# Patient Record
Sex: FEMALE | Race: WHITE | NOT HISPANIC OR LATINO | Employment: FULL TIME | URBAN - METROPOLITAN AREA
[De-identification: names, ages, dates, MRNs, and addresses within clinical notes are randomized per-mention and may not be internally consistent; named-entity substitution may affect disease eponyms.]

---

## 2021-02-14 ENCOUNTER — APPOINTMENT (OUTPATIENT)
Dept: CT IMAGING | Facility: HOSPITAL | Age: 46
End: 2021-02-14

## 2021-02-14 ENCOUNTER — HOSPITAL ENCOUNTER (OUTPATIENT)
Facility: HOSPITAL | Age: 46
Setting detail: OBSERVATION
Discharge: HOME | End: 2021-02-16
Attending: EMERGENCY MEDICINE | Admitting: HOSPITALIST

## 2021-02-14 DIAGNOSIS — Z87.19 HISTORY OF IRRITABLE BOWEL SYNDROME: ICD-10-CM

## 2021-02-14 DIAGNOSIS — R10.32 ACUTE LEFT LOWER QUADRANT PAIN: ICD-10-CM

## 2021-02-14 DIAGNOSIS — K57.20 PERFORATION OF SIGMOID COLON DUE TO DIVERTICULITIS: Primary | ICD-10-CM

## 2021-02-14 PROBLEM — I10 HYPERTENSIVE DISORDER: Status: ACTIVE | Noted: 2019-01-03

## 2021-02-14 PROBLEM — I10 HYPERTENSIVE DISORDER: Status: RESOLVED | Noted: 2019-01-03 | Resolved: 2021-02-14

## 2021-02-14 PROBLEM — K58.9 IBS (IRRITABLE BOWEL SYNDROME): Chronic | Status: ACTIVE | Noted: 2021-02-14

## 2021-02-14 PROBLEM — E03.9 HYPOTHYROIDISM: Status: ACTIVE | Noted: 2018-09-06

## 2021-02-14 PROBLEM — E66.3 OVERWEIGHT: Status: ACTIVE | Noted: 2018-09-06

## 2021-02-14 PROBLEM — E66.9 OBESITY (BMI 30.0-34.9): Chronic | Status: ACTIVE | Noted: 2021-02-14

## 2021-02-14 PROBLEM — R45.86 MOOD SWINGS: Status: ACTIVE | Noted: 2021-02-14

## 2021-02-14 PROBLEM — M77.50 TENDINITIS OF FOOT: Status: ACTIVE | Noted: 2021-02-14

## 2021-02-14 PROBLEM — E66.811 OBESITY (BMI 30.0-34.9): Chronic | Status: ACTIVE | Noted: 2021-02-14

## 2021-02-14 PROBLEM — Z86.018: Status: ACTIVE | Noted: 2021-02-14

## 2021-02-14 PROBLEM — R53.83 FATIGUE: Status: ACTIVE | Noted: 2021-02-14

## 2021-02-14 PROBLEM — B36.9 DERMAL MYCOSIS: Status: ACTIVE | Noted: 2020-05-06

## 2021-02-14 LAB
ALBUMIN SERPL-MCNC: 4.7 G/DL (ref 3.5–5.2)
ALBUMIN/GLOB SERPL: 1.6 G/DL
ALP SERPL-CCNC: 74 U/L (ref 39–117)
ALT SERPL W P-5'-P-CCNC: 67 U/L (ref 1–33)
AMYLASE SERPL-CCNC: 50 U/L (ref 28–100)
ANION GAP SERPL CALCULATED.3IONS-SCNC: 11 MMOL/L (ref 5–15)
APAP SERPL-MCNC: <5 MCG/ML (ref 0–30)
AST SERPL-CCNC: 50 U/L (ref 1–32)
BASOPHILS # BLD AUTO: 0.1 10*3/MM3 (ref 0–0.2)
BASOPHILS NFR BLD AUTO: 0.5 % (ref 0–1.5)
BILIRUB SERPL-MCNC: 0.2 MG/DL (ref 0–1.2)
BILIRUB UR QL STRIP: NEGATIVE
BUN SERPL-MCNC: 12 MG/DL (ref 6–20)
BUN/CREAT SERPL: 15.2 (ref 7–25)
CALCIUM SPEC-SCNC: 9.8 MG/DL (ref 8.6–10.5)
CHLORIDE SERPL-SCNC: 106 MMOL/L (ref 98–107)
CLARITY UR: CLEAR
CO2 SERPL-SCNC: 24 MMOL/L (ref 22–29)
COLOR UR: YELLOW
CREAT SERPL-MCNC: 0.79 MG/DL (ref 0.57–1)
D-LACTATE SERPL-SCNC: 0.7 MMOL/L (ref 0.5–2)
DEPRECATED RDW RBC AUTO: 42.4 FL (ref 37–54)
EOSINOPHIL # BLD AUTO: 0.1 10*3/MM3 (ref 0–0.4)
EOSINOPHIL NFR BLD AUTO: 1 % (ref 0.3–6.2)
ERYTHROCYTE [DISTWIDTH] IN BLOOD BY AUTOMATED COUNT: 13.2 % (ref 12.3–15.4)
GFR SERPL CREATININE-BSD FRML MDRD: 78 ML/MIN/1.73
GLOBULIN UR ELPH-MCNC: 3 GM/DL
GLUCOSE SERPL-MCNC: 88 MG/DL (ref 65–99)
GLUCOSE UR STRIP-MCNC: NEGATIVE MG/DL
HAV IGM SERPL QL IA: NORMAL
HBV CORE IGM SERPL QL IA: NORMAL
HBV SURFACE AG SERPL QL IA: NORMAL
HCG INTACT+B SERPL-ACNC: 3.56 MIU/ML
HCT VFR BLD AUTO: 37.4 % (ref 34–46.6)
HCV AB SER DONR QL: NORMAL
HGB BLD-MCNC: 13 G/DL (ref 12–15.9)
HGB UR QL STRIP.AUTO: NEGATIVE
HOLD SPECIMEN: NORMAL
KETONES UR QL STRIP: NEGATIVE
LEUKOCYTE ESTERASE UR QL STRIP.AUTO: NEGATIVE
LIPASE SERPL-CCNC: 51 U/L (ref 13–60)
LYMPHOCYTES # BLD AUTO: 1.6 10*3/MM3 (ref 0.7–3.1)
LYMPHOCYTES NFR BLD AUTO: 13.5 % (ref 19.6–45.3)
MCH RBC QN AUTO: 31.7 PG (ref 26.6–33)
MCHC RBC AUTO-ENTMCNC: 34.7 G/DL (ref 31.5–35.7)
MCV RBC AUTO: 91.2 FL (ref 79–97)
MONOCYTES # BLD AUTO: 0.9 10*3/MM3 (ref 0.1–0.9)
MONOCYTES NFR BLD AUTO: 7.1 % (ref 5–12)
NEUTROPHILS NFR BLD AUTO: 77.9 % (ref 42.7–76)
NEUTROPHILS NFR BLD AUTO: 9.4 10*3/MM3 (ref 1.7–7)
NITRITE UR QL STRIP: NEGATIVE
NRBC BLD AUTO-RTO: 0.1 /100 WBC (ref 0–0.2)
PH UR STRIP.AUTO: <=5 [PH] (ref 5–8)
PLATELET # BLD AUTO: 434 10*3/MM3 (ref 140–450)
PMV BLD AUTO: 6.9 FL (ref 6–12)
POTASSIUM SERPL-SCNC: 3.9 MMOL/L (ref 3.5–5.2)
PROCALCITONIN SERPL-MCNC: 0.04 NG/ML (ref 0–0.25)
PROT SERPL-MCNC: 7.7 G/DL (ref 6–8.5)
PROT UR QL STRIP: NEGATIVE
RBC # BLD AUTO: 4.1 10*6/MM3 (ref 3.77–5.28)
SODIUM SERPL-SCNC: 141 MMOL/L (ref 136–145)
SP GR UR STRIP: 1.01 (ref 1–1.03)
UROBILINOGEN UR QL STRIP: NORMAL
WBC # BLD AUTO: 12.1 10*3/MM3 (ref 3.4–10.8)
WHOLE BLOOD HOLD SPECIMEN: NORMAL

## 2021-02-14 PROCEDURE — 96368 THER/DIAG CONCURRENT INF: CPT

## 2021-02-14 PROCEDURE — 80307 DRUG TEST PRSMV CHEM ANLYZR: CPT | Performed by: HOSPITALIST

## 2021-02-14 PROCEDURE — 96375 TX/PRO/DX INJ NEW DRUG ADDON: CPT

## 2021-02-14 PROCEDURE — 85025 COMPLETE CBC W/AUTO DIFF WBC: CPT | Performed by: EMERGENCY MEDICINE

## 2021-02-14 PROCEDURE — 74177 CT ABD & PELVIS W/CONTRAST: CPT

## 2021-02-14 PROCEDURE — 25010000002 ONDANSETRON PER 1 MG: Performed by: EMERGENCY MEDICINE

## 2021-02-14 PROCEDURE — 83605 ASSAY OF LACTIC ACID: CPT | Performed by: NURSE PRACTITIONER

## 2021-02-14 PROCEDURE — 81025 URINE PREGNANCY TEST: CPT | Performed by: HOSPITALIST

## 2021-02-14 PROCEDURE — 25010000002 CEFEPIME PER 500 MG: Performed by: NURSE PRACTITIONER

## 2021-02-14 PROCEDURE — 96365 THER/PROPH/DIAG IV INF INIT: CPT

## 2021-02-14 PROCEDURE — 96366 THER/PROPH/DIAG IV INF ADDON: CPT

## 2021-02-14 PROCEDURE — 96367 TX/PROPH/DG ADDL SEQ IV INF: CPT

## 2021-02-14 PROCEDURE — G0378 HOSPITAL OBSERVATION PER HR: HCPCS

## 2021-02-14 PROCEDURE — 81003 URINALYSIS AUTO W/O SCOPE: CPT | Performed by: EMERGENCY MEDICINE

## 2021-02-14 PROCEDURE — 83690 ASSAY OF LIPASE: CPT | Performed by: EMERGENCY MEDICINE

## 2021-02-14 PROCEDURE — 25010000002 HYDROMORPHONE PER 4 MG: Performed by: EMERGENCY MEDICINE

## 2021-02-14 PROCEDURE — 0 IOPAMIDOL PER 1 ML: Performed by: EMERGENCY MEDICINE

## 2021-02-14 PROCEDURE — 84145 PROCALCITONIN (PCT): CPT | Performed by: NURSE PRACTITIONER

## 2021-02-14 PROCEDURE — 25010000002 LEVOFLOXACIN PER 250 MG: Performed by: EMERGENCY MEDICINE

## 2021-02-14 PROCEDURE — 80143 DRUG ASSAY ACETAMINOPHEN: CPT | Performed by: NURSE PRACTITIONER

## 2021-02-14 PROCEDURE — 99220 PR INITIAL OBSERVATION CARE/DAY 70 MINUTES: CPT | Performed by: NURSE PRACTITIONER

## 2021-02-14 PROCEDURE — 99285 EMERGENCY DEPT VISIT HI MDM: CPT

## 2021-02-14 PROCEDURE — U0004 COV-19 TEST NON-CDC HGH THRU: HCPCS | Performed by: EMERGENCY MEDICINE

## 2021-02-14 PROCEDURE — 80053 COMPREHEN METABOLIC PANEL: CPT | Performed by: EMERGENCY MEDICINE

## 2021-02-14 PROCEDURE — 25010000002 MORPHINE PER 10 MG: Performed by: NURSE PRACTITIONER

## 2021-02-14 PROCEDURE — 80074 ACUTE HEPATITIS PANEL: CPT | Performed by: NURSE PRACTITIONER

## 2021-02-14 PROCEDURE — 96376 TX/PRO/DX INJ SAME DRUG ADON: CPT

## 2021-02-14 PROCEDURE — 25010000002 ENOXAPARIN PER 10 MG: Performed by: NURSE PRACTITIONER

## 2021-02-14 PROCEDURE — 82150 ASSAY OF AMYLASE: CPT | Performed by: NURSE PRACTITIONER

## 2021-02-14 PROCEDURE — C9803 HOPD COVID-19 SPEC COLLECT: HCPCS

## 2021-02-14 PROCEDURE — 25010000002 ONDANSETRON PER 1 MG: Performed by: NURSE PRACTITIONER

## 2021-02-14 PROCEDURE — 96372 THER/PROPH/DIAG INJ SC/IM: CPT

## 2021-02-14 PROCEDURE — 84702 CHORIONIC GONADOTROPIN TEST: CPT | Performed by: NURSE PRACTITIONER

## 2021-02-14 PROCEDURE — 87040 BLOOD CULTURE FOR BACTERIA: CPT | Performed by: NURSE PRACTITIONER

## 2021-02-14 PROCEDURE — 99284 EMERGENCY DEPT VISIT MOD MDM: CPT

## 2021-02-14 RX ORDER — ACETAMINOPHEN 160 MG/5ML
650 SOLUTION ORAL EVERY 4 HOURS PRN
Status: DISCONTINUED | OUTPATIENT
Start: 2021-02-14 | End: 2021-02-16 | Stop reason: HOSPADM

## 2021-02-14 RX ORDER — MORPHINE SULFATE 4 MG/ML
1 INJECTION, SOLUTION INTRAMUSCULAR; INTRAVENOUS EVERY 4 HOURS PRN
Status: DISCONTINUED | OUTPATIENT
Start: 2021-02-14 | End: 2021-02-15

## 2021-02-14 RX ORDER — HYDROMORPHONE HCL 110MG/55ML
0.5 PATIENT CONTROLLED ANALGESIA SYRINGE INTRAVENOUS ONCE
Status: COMPLETED | OUTPATIENT
Start: 2021-02-14 | End: 2021-02-14

## 2021-02-14 RX ORDER — BISACODYL 10 MG
10 SUPPOSITORY, RECTAL RECTAL DAILY PRN
Status: DISCONTINUED | OUTPATIENT
Start: 2021-02-14 | End: 2021-02-16 | Stop reason: HOSPADM

## 2021-02-14 RX ORDER — SODIUM CHLORIDE 9 MG/ML
100 INJECTION, SOLUTION INTRAVENOUS CONTINUOUS
Status: DISCONTINUED | OUTPATIENT
Start: 2021-02-14 | End: 2021-02-16 | Stop reason: HOSPADM

## 2021-02-14 RX ORDER — FERROUS SULFATE 325(65) MG
325 TABLET ORAL
COMMUNITY
End: 2022-12-21 | Stop reason: ALTCHOICE

## 2021-02-14 RX ORDER — NITROGLYCERIN 0.4 MG/1
0.4 TABLET SUBLINGUAL
Status: DISCONTINUED | OUTPATIENT
Start: 2021-02-14 | End: 2021-02-16 | Stop reason: HOSPADM

## 2021-02-14 RX ORDER — LEVOFLOXACIN 5 MG/ML
750 INJECTION, SOLUTION INTRAVENOUS ONCE
Status: COMPLETED | OUTPATIENT
Start: 2021-02-14 | End: 2021-02-14

## 2021-02-14 RX ORDER — SODIUM CHLORIDE 0.9 % (FLUSH) 0.9 %
10 SYRINGE (ML) INJECTION EVERY 12 HOURS SCHEDULED
Status: DISCONTINUED | OUTPATIENT
Start: 2021-02-14 | End: 2021-02-16 | Stop reason: HOSPADM

## 2021-02-14 RX ORDER — ACETAMINOPHEN 325 MG/1
650 TABLET ORAL EVERY 4 HOURS PRN
Status: DISCONTINUED | OUTPATIENT
Start: 2021-02-14 | End: 2021-02-16 | Stop reason: HOSPADM

## 2021-02-14 RX ORDER — ACETAMINOPHEN 650 MG/1
650 SUPPOSITORY RECTAL EVERY 4 HOURS PRN
Status: DISCONTINUED | OUTPATIENT
Start: 2021-02-14 | End: 2021-02-16 | Stop reason: HOSPADM

## 2021-02-14 RX ORDER — SODIUM CHLORIDE 0.9 % (FLUSH) 0.9 %
10 SYRINGE (ML) INJECTION AS NEEDED
Status: DISCONTINUED | OUTPATIENT
Start: 2021-02-14 | End: 2021-02-16 | Stop reason: HOSPADM

## 2021-02-14 RX ORDER — MELATONIN
2000 DAILY
COMMUNITY
End: 2022-12-21 | Stop reason: ALTCHOICE

## 2021-02-14 RX ORDER — ONDANSETRON 2 MG/ML
4 INJECTION INTRAMUSCULAR; INTRAVENOUS EVERY 6 HOURS PRN
Status: DISCONTINUED | OUTPATIENT
Start: 2021-02-14 | End: 2021-02-16 | Stop reason: HOSPADM

## 2021-02-14 RX ORDER — MULTIPLE VITAMINS W/ MINERALS TAB 9MG-400MCG
1 TAB ORAL DAILY
COMMUNITY
End: 2022-12-21 | Stop reason: ALTCHOICE

## 2021-02-14 RX ORDER — ONDANSETRON 2 MG/ML
4 INJECTION INTRAMUSCULAR; INTRAVENOUS ONCE
Status: COMPLETED | OUTPATIENT
Start: 2021-02-14 | End: 2021-02-14

## 2021-02-14 RX ADMIN — SODIUM CHLORIDE 100 ML/HR: 9 INJECTION, SOLUTION INTRAVENOUS at 22:33

## 2021-02-14 RX ADMIN — LEVOFLOXACIN 750 MG: 5 INJECTION, SOLUTION INTRAVENOUS at 20:38

## 2021-02-14 RX ADMIN — METRONIDAZOLE 500 MG: 500 INJECTION, SOLUTION INTRAVENOUS at 20:38

## 2021-02-14 RX ADMIN — ONDANSETRON 4 MG: 2 INJECTION, SOLUTION INTRAMUSCULAR; INTRAVENOUS at 23:04

## 2021-02-14 RX ADMIN — Medication 10 ML: at 22:33

## 2021-02-14 RX ADMIN — SODIUM CHLORIDE 1000 ML: 9 INJECTION, SOLUTION INTRAVENOUS at 20:44

## 2021-02-14 RX ADMIN — MORPHINE SULFATE 1 MG: 4 INJECTION INTRAVENOUS at 23:04

## 2021-02-14 RX ADMIN — IOPAMIDOL 100 ML: 755 INJECTION, SOLUTION INTRAVENOUS at 18:13

## 2021-02-14 RX ADMIN — ONDANSETRON 4 MG: 2 INJECTION, SOLUTION INTRAMUSCULAR; INTRAVENOUS at 17:05

## 2021-02-14 RX ADMIN — SODIUM CHLORIDE 1000 ML: 9 INJECTION, SOLUTION INTRAVENOUS at 17:05

## 2021-02-14 RX ADMIN — CEFEPIME HYDROCHLORIDE 2 G: 2 INJECTION, POWDER, FOR SOLUTION INTRAVENOUS at 23:05

## 2021-02-14 RX ADMIN — HYDROMORPHONE HYDROCHLORIDE 0.5 MG: 2 INJECTION, SOLUTION INTRAMUSCULAR; INTRAVENOUS; SUBCUTANEOUS at 17:05

## 2021-02-14 RX ADMIN — ENOXAPARIN SODIUM 40 MG: 40 INJECTION SUBCUTANEOUS at 23:04

## 2021-02-14 RX ADMIN — HYDROMORPHONE HYDROCHLORIDE 0.5 MG: 2 INJECTION, SOLUTION INTRAMUSCULAR; INTRAVENOUS; SUBCUTANEOUS at 20:38

## 2021-02-15 LAB
ALBUMIN SERPL-MCNC: 3.7 G/DL (ref 3.5–5.2)
ALBUMIN/GLOB SERPL: 1.5 G/DL
ALP SERPL-CCNC: 59 U/L (ref 39–117)
ALT SERPL W P-5'-P-CCNC: 48 U/L (ref 1–33)
AMPHET+METHAMPHET UR QL: POSITIVE
ANION GAP SERPL CALCULATED.3IONS-SCNC: 10 MMOL/L (ref 5–15)
AST SERPL-CCNC: 32 U/L (ref 1–32)
B-HCG UR QL: NEGATIVE
BARBITURATES UR QL SCN: NEGATIVE
BASOPHILS # BLD AUTO: 0.1 10*3/MM3 (ref 0–0.2)
BASOPHILS NFR BLD AUTO: 1 % (ref 0–1.5)
BENZODIAZ UR QL SCN: NEGATIVE
BILIRUB SERPL-MCNC: 0.2 MG/DL (ref 0–1.2)
BUN SERPL-MCNC: 8 MG/DL (ref 6–20)
BUN/CREAT SERPL: 10.4 (ref 7–25)
CALCIUM SPEC-SCNC: 8.8 MG/DL (ref 8.6–10.5)
CANNABINOIDS SERPL QL: NEGATIVE
CHLORIDE SERPL-SCNC: 109 MMOL/L (ref 98–107)
CO2 SERPL-SCNC: 22 MMOL/L (ref 22–29)
COCAINE UR QL: NEGATIVE
CREAT SERPL-MCNC: 0.77 MG/DL (ref 0.57–1)
D-LACTATE SERPL-SCNC: 0.6 MMOL/L (ref 0.5–2)
DEPRECATED RDW RBC AUTO: 42.4 FL (ref 37–54)
EOSINOPHIL # BLD AUTO: 0.3 10*3/MM3 (ref 0–0.4)
EOSINOPHIL NFR BLD AUTO: 3.9 % (ref 0.3–6.2)
ERYTHROCYTE [DISTWIDTH] IN BLOOD BY AUTOMATED COUNT: 13.2 % (ref 12.3–15.4)
GFR SERPL CREATININE-BSD FRML MDRD: 81 ML/MIN/1.73
GLOBULIN UR ELPH-MCNC: 2.4 GM/DL
GLUCOSE SERPL-MCNC: 81 MG/DL (ref 65–99)
HCT VFR BLD AUTO: 33.8 % (ref 34–46.6)
HGB BLD-MCNC: 11.4 G/DL (ref 12–15.9)
LYMPHOCYTES # BLD AUTO: 2.4 10*3/MM3 (ref 0.7–3.1)
LYMPHOCYTES NFR BLD AUTO: 34.9 % (ref 19.6–45.3)
MCH RBC QN AUTO: 31 PG (ref 26.6–33)
MCHC RBC AUTO-ENTMCNC: 33.6 G/DL (ref 31.5–35.7)
MCV RBC AUTO: 92.2 FL (ref 79–97)
METHADONE UR QL SCN: NEGATIVE
MONOCYTES # BLD AUTO: 0.7 10*3/MM3 (ref 0.1–0.9)
MONOCYTES NFR BLD AUTO: 10.6 % (ref 5–12)
NEUTROPHILS NFR BLD AUTO: 3.4 10*3/MM3 (ref 1.7–7)
NEUTROPHILS NFR BLD AUTO: 49.6 % (ref 42.7–76)
NRBC BLD AUTO-RTO: 0.1 /100 WBC (ref 0–0.2)
OPIATES UR QL: NEGATIVE
OXYCODONE UR QL SCN: NEGATIVE
PLATELET # BLD AUTO: 371 10*3/MM3 (ref 140–450)
PMV BLD AUTO: 7.1 FL (ref 6–12)
POTASSIUM SERPL-SCNC: 3.7 MMOL/L (ref 3.5–5.2)
PROT SERPL-MCNC: 6.1 G/DL (ref 6–8.5)
RBC # BLD AUTO: 3.67 10*6/MM3 (ref 3.77–5.28)
SARS-COV-2 ORF1AB RESP QL NAA+PROBE: NOT DETECTED
SODIUM SERPL-SCNC: 141 MMOL/L (ref 136–145)
WBC # BLD AUTO: 6.9 10*3/MM3 (ref 3.4–10.8)

## 2021-02-15 PROCEDURE — G0378 HOSPITAL OBSERVATION PER HR: HCPCS

## 2021-02-15 PROCEDURE — 96375 TX/PRO/DX INJ NEW DRUG ADDON: CPT

## 2021-02-15 PROCEDURE — 85025 COMPLETE CBC W/AUTO DIFF WBC: CPT | Performed by: NURSE PRACTITIONER

## 2021-02-15 PROCEDURE — 25010000002 METOCLOPRAMIDE PER 10 MG: Performed by: HOSPITALIST

## 2021-02-15 PROCEDURE — 25010000002 KETOROLAC TROMETHAMINE PER 15 MG: Performed by: HOSPITALIST

## 2021-02-15 PROCEDURE — 96366 THER/PROPH/DIAG IV INF ADDON: CPT

## 2021-02-15 PROCEDURE — 25010000002 CEFEPIME PER 500 MG: Performed by: NURSE PRACTITIONER

## 2021-02-15 PROCEDURE — 99203 OFFICE O/P NEW LOW 30 MIN: CPT | Performed by: SURGERY

## 2021-02-15 PROCEDURE — 80184 ASSAY OF PHENOBARBITAL: CPT | Performed by: NURSE PRACTITIONER

## 2021-02-15 PROCEDURE — 25010000002 HYDROMORPHONE PER 4 MG: Performed by: NURSE PRACTITIONER

## 2021-02-15 PROCEDURE — 25010000002 DIPHENHYDRAMINE PER 50 MG: Performed by: HOSPITALIST

## 2021-02-15 PROCEDURE — 25010000002 MORPHINE PER 10 MG: Performed by: HOSPITALIST

## 2021-02-15 PROCEDURE — 25010000002 ENOXAPARIN PER 10 MG: Performed by: NURSE PRACTITIONER

## 2021-02-15 PROCEDURE — G0480 DRUG TEST DEF 1-7 CLASSES: HCPCS | Performed by: NURSE PRACTITIONER

## 2021-02-15 PROCEDURE — 25010000002 ONDANSETRON PER 1 MG: Performed by: NURSE PRACTITIONER

## 2021-02-15 PROCEDURE — 96372 THER/PROPH/DIAG INJ SC/IM: CPT

## 2021-02-15 PROCEDURE — 96376 TX/PRO/DX INJ SAME DRUG ADON: CPT

## 2021-02-15 PROCEDURE — 83605 ASSAY OF LACTIC ACID: CPT | Performed by: NURSE PRACTITIONER

## 2021-02-15 PROCEDURE — 99225 PR SBSQ OBSERVATION CARE/DAY 25 MINUTES: CPT | Performed by: HOSPITALIST

## 2021-02-15 PROCEDURE — 80053 COMPREHEN METABOLIC PANEL: CPT | Performed by: NURSE PRACTITIONER

## 2021-02-15 PROCEDURE — 80179 DRUG ASSAY SALICYLATE: CPT | Performed by: NURSE PRACTITIONER

## 2021-02-15 RX ORDER — PROCHLORPERAZINE EDISYLATE 5 MG/ML
10 INJECTION INTRAMUSCULAR; INTRAVENOUS EVERY 6 HOURS PRN
Status: DISCONTINUED | OUTPATIENT
Start: 2021-02-15 | End: 2021-02-16 | Stop reason: HOSPADM

## 2021-02-15 RX ORDER — KETOROLAC TROMETHAMINE 30 MG/ML
30 INJECTION, SOLUTION INTRAMUSCULAR; INTRAVENOUS ONCE
Status: COMPLETED | OUTPATIENT
Start: 2021-02-15 | End: 2021-02-15

## 2021-02-15 RX ORDER — METOCLOPRAMIDE HYDROCHLORIDE 5 MG/ML
10 INJECTION INTRAMUSCULAR; INTRAVENOUS ONCE
Status: COMPLETED | OUTPATIENT
Start: 2021-02-15 | End: 2021-02-15

## 2021-02-15 RX ORDER — DIPHENHYDRAMINE HYDROCHLORIDE 50 MG/ML
25 INJECTION INTRAMUSCULAR; INTRAVENOUS ONCE
Status: COMPLETED | OUTPATIENT
Start: 2021-02-15 | End: 2021-02-15

## 2021-02-15 RX ORDER — MORPHINE SULFATE 4 MG/ML
2 INJECTION, SOLUTION INTRAMUSCULAR; INTRAVENOUS EVERY 4 HOURS PRN
Status: DISCONTINUED | OUTPATIENT
Start: 2021-02-15 | End: 2021-02-16 | Stop reason: HOSPADM

## 2021-02-15 RX ORDER — HYDROMORPHONE HCL 110MG/55ML
1 PATIENT CONTROLLED ANALGESIA SYRINGE INTRAVENOUS
Status: DISCONTINUED | OUTPATIENT
Start: 2021-02-15 | End: 2021-02-15

## 2021-02-15 RX ADMIN — ENOXAPARIN SODIUM 40 MG: 40 INJECTION SUBCUTANEOUS at 15:12

## 2021-02-15 RX ADMIN — METRONIDAZOLE 500 MG: 500 INJECTION, SOLUTION INTRAVENOUS at 05:04

## 2021-02-15 RX ADMIN — METOCLOPRAMIDE HYDROCHLORIDE 10 MG: 5 INJECTION INTRAMUSCULAR; INTRAVENOUS at 11:17

## 2021-02-15 RX ADMIN — HYDROMORPHONE HYDROCHLORIDE 1 MG: 2 INJECTION, SOLUTION INTRAMUSCULAR; INTRAVENOUS; SUBCUTANEOUS at 04:57

## 2021-02-15 RX ADMIN — CEFEPIME HYDROCHLORIDE 2 G: 2 INJECTION, POWDER, FOR SOLUTION INTRAVENOUS at 14:37

## 2021-02-15 RX ADMIN — METRONIDAZOLE 500 MG: 500 INJECTION, SOLUTION INTRAVENOUS at 14:35

## 2021-02-15 RX ADMIN — Medication 10 ML: at 08:03

## 2021-02-15 RX ADMIN — SODIUM CHLORIDE 100 ML/HR: 9 INJECTION, SOLUTION INTRAVENOUS at 14:37

## 2021-02-15 RX ADMIN — ONDANSETRON 4 MG: 2 INJECTION, SOLUTION INTRAMUSCULAR; INTRAVENOUS at 08:01

## 2021-02-15 RX ADMIN — ACETAMINOPHEN 650 MG: 325 TABLET, FILM COATED ORAL at 10:28

## 2021-02-15 RX ADMIN — HYDROMORPHONE HYDROCHLORIDE 1 MG: 2 INJECTION, SOLUTION INTRAMUSCULAR; INTRAVENOUS; SUBCUTANEOUS at 01:07

## 2021-02-15 RX ADMIN — MORPHINE SULFATE 2 MG: 4 INJECTION INTRAVENOUS at 20:57

## 2021-02-15 RX ADMIN — METRONIDAZOLE 500 MG: 500 INJECTION, SOLUTION INTRAVENOUS at 20:36

## 2021-02-15 RX ADMIN — CEFEPIME HYDROCHLORIDE 2 G: 2 INJECTION, POWDER, FOR SOLUTION INTRAVENOUS at 05:45

## 2021-02-15 RX ADMIN — HYDROMORPHONE HYDROCHLORIDE 1 MG: 2 INJECTION, SOLUTION INTRAMUSCULAR; INTRAVENOUS; SUBCUTANEOUS at 08:02

## 2021-02-15 RX ADMIN — KETOROLAC TROMETHAMINE 30 MG: 30 INJECTION, SOLUTION INTRAMUSCULAR at 11:17

## 2021-02-15 RX ADMIN — Medication 10 ML: at 20:37

## 2021-02-15 RX ADMIN — CEFEPIME HYDROCHLORIDE 2 G: 2 INJECTION, POWDER, FOR SOLUTION INTRAVENOUS at 21:34

## 2021-02-15 RX ADMIN — MORPHINE SULFATE 2 MG: 4 INJECTION INTRAVENOUS at 15:12

## 2021-02-15 RX ADMIN — DIPHENHYDRAMINE HYDROCHLORIDE 25 MG: 50 INJECTION INTRAMUSCULAR; INTRAVENOUS at 11:17

## 2021-02-15 NOTE — ED NOTES
Assumed care of this patient from NOHEMI Carbajal at 1900     Leslee Saunders LPN  02/14/21 1911

## 2021-02-15 NOTE — CONSULTS
"GENERAL SURGERY CONSULT      Patient Care Team:  Sergio Clifford MD as PCP - General (Family Medicine)    Chief complaint lower abdominal pain  Subjective     Is a 45-year-old lady who began having some sharp and severe lower abdominal pain about 3 days ago.  She describes it as high amount of pressure building up in the area.  She had no fever no chills.  She did try some Excedrin Migraine tablets but this did not help.  She came to the emergency room and CT scan demonstrated evidence of sigmoid diverticulitis.  There was a possibility of a possible abscess formation but this is unclear.    She has a past surgical history of a hysterectomy for endometriosis.  She had some kind of fatty tumor removed in the left lower abdomen.  She is also had a cholecystectomy and  section.    Review of Systems   All systems were reviewed and negative except for:  Gastrointestinal: positive for  pain and See HPI    History  Past Medical History:   Diagnosis Date   • Acetaminophen abuse     Takes 3 Excedrin Migraine daily   • Hypertension    • IBS (irritable bowel syndrome)    • Migraines      Past Surgical History:   Procedure Laterality Date   • ABDOMINAL SURGERY      Patient states that after last  section, she was told that her organs had fused together and the surgeon had to \"rebuild \"her abdomen.   •  SECTION     • CHOLECYSTECTOMY     • HYSTERECTOMY     • TUMOR REMOVAL Left     Fatty tumor left lower abdomen     Family History   Problem Relation Age of Onset   • Cancer Mother      Social History     Tobacco Use   • Smoking status: Former Smoker     Packs/day: 1.00     Years: 20.00     Pack years: 20.00     Quit date: 2017     Years since quittin.0   • Smokeless tobacco: Never Used   Substance Use Topics   • Alcohol use: Not Currently   • Drug use: Never     Medications Prior to Admission   Medication Sig Dispense Refill Last Dose   • cholecalciferol (VITAMIN D3) 25 MCG (1000 UT) tablet Take " 2,000 Units by mouth Daily.      • ferrous sulfate 325 (65 FE) MG tablet Take 325 mg by mouth Daily With Breakfast.      • multivitamin with minerals tablet tablet Take 1 tablet by mouth Daily.        Allergies:  Dilaudid [hydromorphone hcl], Metformin, and Penicillins    Objective     Vital Signs  Temp:  [97.7 °F (36.5 °C)-98.5 °F (36.9 °C)] 98 °F (36.7 °C)  Heart Rate:  [75-94] 94  Resp:  [14-18] 16  BP: (111-135)/(60-82) 122/70    Physical Exam:      General Appearance:    Alert, cooperative, in no acute distress   Head:    Normocephalic, without obvious abnormality, atraumatic,    Eyes:            Lids and lashes normal, conjunctivae and sclerae normal, no   icterus, no pallor, corneas clear, PERRLA   Ears:    Ears appear intact with no abnormalities noted   Throat:   No oral lesions, no thrush, oral mucosa moist   Neck:   No adenopathy, supple, trachea midline, no thyromegaly, no   carotid bruit, no JVD   Back:     No kyphosis present, no scoliosis present, no skin lesions,      erythema or scars, no tenderness to percussion or                   palpation,   range of motion normal   Lungs:     Clear to auscultation,respirations regular, even and                  unlabored    Heart:    Regular rhythm and normal rate, normal S1 and S2, no            murmur, no gallop, no rub, no click   Chest Wall:    No abnormalities observed   Abdomen:     Normal bowel sounds, no masses, no organomegaly, soft        very tender just to the left of midline in the suprapubic area but also tender across the entire lower abdomen., non-distended, no guarding, no rebound                tenderness   Rectal:     Deferred   Extremities: No edema, good ROM   Pulses:   Pulses palpable and equal bilaterally   Skin:   No bleeding, bruising or rash   Lymph nodes:   No palpable adenopathy   Neurologic:   Cranial nerves 2 - 12 grossly intact, sensation intact, DTR       present and equal bilaterally     Lab Results (last 24 hours)      Procedure Component Value Units Date/Time    COVID PRE-OP / PRE-PROCEDURE SCREENING ORDER (NO ISOLATION) - Swab, Nasopharynx [[id number]]  (Normal) Collected: 02/14/21 2039    Specimen: Swab from Nasopharynx Updated: 02/15/21 1422    Narrative:      The following orders were created for panel order COVID PRE-OP / PRE-PROCEDURE SCREENING ORDER (NO ISOLATION) - Swab, Nasopharynx.  Procedure                               Abnormality         Status                     ---------                               -----------         ------                     COVID-19,APTIMA PANTHER,...[[id number]]  Normal              Final result                 Please view results for these tests on the individual orders.    COVID-19,APTIMA PANTHER,[facility]SE, NP/OP SWAB IN UTM/VTM/[facility] TRANSPORT MEDIA,24 HR TAT - Swab, Nasopharynx [[id number]]  (Normal) Collected: 02/14/21 2039    Specimen: Swab from Nasopharynx Updated: 02/15/21 1422     COVID19 Not Detected    Narrative:      Fact sheet for providers: https://www.fda.gov/media/758108/download     Fact sheet for patients: https://www.fda.gov/media/094474/download    Test performed by RT PCR.    Urine Drug Screen - Urine, Clean Catch [[id number]]  (Abnormal) Collected: 02/14/21 1659    Specimen: Urine, Clean Catch Updated: 02/15/21 1126     Amphet/Methamphet, Screen Positive     Barbiturates Screen, Urine Negative     Benzodiazepine Screen, Urine Negative     Cocaine Screen, Urine Negative     Opiate Screen Negative     THC, Screen, Urine Negative     Methadone Screen, Urine Negative     Oxycodone Screen, Urine Negative    Narrative:      Negative Thresholds For Drugs Screened:     Amphetamines               500 ng/ml   Barbiturates               200 ng/ml   Benzodiazepines            100 ng/ml   Cocaine                    300 ng/ml   Methadone                  300 ng/ml   Opiates                    300 ng/ml   Oxycodone                  100 ng/ml   THC                        50  ng/ml    The Normal Value for all drugs tested is negative. This report includes final unconfirmed screening results to be used for medical treatment purposes only. Unconfirmed results must not be used for non-medical purposes such as employment or legal testing. Clinical consideration should be applied to any drug of abuse test, particulary when unconfirmed results are used.  All urine drugs of abuse requests without chain of custody are for medical screening purposes only.  False positives are possible.      Pregnancy, Urine - Urine, Clean Catch [[id number]]  (Normal) Collected: 02/14/21 1659    Specimen: Urine, Clean Catch Updated: 02/15/21 1109     HCG, Urine QL Negative    Lactic Acid, Plasma [[id number]]  (Normal) Collected: 02/15/21 0333    Specimen: Blood Updated: 02/15/21 0401     Lactate 0.6 mmol/L     Comprehensive Metabolic Panel [[id number]]  (Abnormal) Collected: 02/15/21 0333    Specimen: Blood Updated: 02/15/21 0400     Glucose 81 mg/dL      BUN 8 mg/dL      Creatinine 0.77 mg/dL      Sodium 141 mmol/L      Potassium 3.7 mmol/L      Chloride 109 mmol/L      CO2 22.0 mmol/L      Calcium 8.8 mg/dL      Total Protein 6.1 g/dL      Albumin 3.70 g/dL      ALT (SGPT) 48 U/L      AST (SGOT) 32 U/L      Alkaline Phosphatase 59 U/L      Total Bilirubin 0.2 mg/dL      eGFR Non African Amer 81 mL/min/1.73      Globulin 2.4 gm/dL      A/G Ratio 1.5 g/dL      BUN/Creatinine Ratio 10.4     Anion Gap 10.0 mmol/L     Narrative:      GFR Normal >60  Chronic Kidney Disease <60  Kidney Failure <15      CBC & Differential [[id number]]  (Abnormal) Collected: 02/15/21 0333    Specimen: Blood Updated: 02/15/21 0343    Narrative:      The following orders were created for panel order CBC & Differential.  Procedure                               Abnormality         Status                     ---------                               -----------         ------                     CBC Auto Differential[[id number]]        Abnormal             Final result                 Please view results for these tests on the individual orders.    CBC Auto Differential [[id number]]  (Abnormal) Collected: 02/15/21 0333    Specimen: Blood Updated: 02/15/21 0343     WBC 6.90 10*3/mm3      RBC 3.67 10*6/mm3      Hemoglobin 11.4 g/dL      Hematocrit 33.8 %      MCV 92.2 fL      MCH 31.0 pg      MCHC 33.6 g/dL      RDW 13.2 %      RDW-SD 42.4 fl      MPV 7.1 fL      Platelets 371 10*3/mm3      Neutrophil % 49.6 %      Lymphocyte % 34.9 %      Monocyte % 10.6 %      Eosinophil % 3.9 %      Basophil % 1.0 %      Neutrophils, Absolute 3.40 10*3/mm3      Lymphocytes, Absolute 2.40 10*3/mm3      Monocytes, Absolute 0.70 10*3/mm3      Eosinophils, Absolute 0.30 10*3/mm3      Basophils, Absolute 0.10 10*3/mm3      nRBC 0.1 /100 WBC     Toxicology Screen, Serum [[id number]] Collected: 02/15/21 0333    Specimen: Blood Updated: 02/15/21 0339    Amylase [[id number]]  (Normal) Collected: 02/14/21 2243    Specimen: Blood Updated: 02/14/21 2344     Amylase 50 U/L     Hepatitis Panel, Acute [[id number]]  (Normal) Collected: 02/14/21 2243    Specimen: Blood Updated: 02/14/21 2344     Hepatitis B Surface Ag Non-Reactive     Hep A IgM Non-Reactive     Hep B C IgM Non-Reactive     Hepatitis C Ab Non-Reactive    Narrative:      Results may be falsely decreased if patient taking Biotin.     Acetaminophen Level [[id number]]  (Normal) Collected: 02/14/21 2243    Specimen: Blood Updated: 02/14/21 2344     Acetaminophen <5.0 mcg/mL     Narrative:      Acetaminophen Therapeutic Range  5-20 ug/mL      Hours after ingestion            Toxic Value    4 Hours                           150 ug/mL    8 Hours                            70 ug/mL   12 Hours                            40 ug/mL   16 Hours                            20 ug/mL    These values apply to a single ingestion only.     Procalcitonin [[id number]]  (Normal) Collected: 02/14/21 2243    Specimen: Blood Updated: 02/14/21 2332      "Procalcitonin 0.04 ng/mL     Narrative:      As a Marker for Sepsis (Non-Neonates):   1. <0.5 ng/mL represents a low risk of severe sepsis and/or septic shock.  1. >2 ng/mL represents a high risk of severe sepsis and/or septic shock.    As a Marker for Lower Respiratory Tract Infections that require antibiotic therapy:  PCT on Admission     Antibiotic Therapy             6-12 Hrs later  > 0.5                Strongly Recommended            >0.25 - <0.5         Recommended  0.1 - 0.25           Discouraged                   Remeasure/reassess PCT  <0.1                 Strongly Discouraged          Remeasure/reassess PCT      As 28 day mortality risk marker: \"Change in Procalcitonin Result\" (> 80 % or <=80 %) if Day 0 (or Day 1) and Day 4 values are available. Refer to http://www.[employer]-calculator.[employer]/   Change in PCT <=80 %   A decrease of PCT levels below or equal to 80 % defines a positive change in PCT test result representing a higher risk for 28-day all-cause mortality of patients diagnosed with severe sepsis or septic shock.  Change in PCT > 80 %   A decrease of PCT levels of more than 80 % defines a negative change in PCT result representing a lower risk for 28-day all-cause mortality of patients diagnosed with severe sepsis or septic shock.                Results may be falsely decreased if patient taking Biotin.     hCG, Quantitative, Pregnancy [[id number]] Collected: 02/14/21 2243    Specimen: Blood Updated: 02/14/21 2331     HCG Quantitative 3.56 mIU/mL     Narrative:      HCG Ranges by Gestational Age    Females - non-pregnant premenopausal   </= 1mIU/mL HCG  Females - postmenopausal               </= 7mIU/mL HCG    3 Weeks         5.8 -    71.2 mIU/mL  4 Weeks         9.5 -     750 mIU/mL  5 Weeks         217 -   7,138 mIU/mL  6 Weeks         158 -  31,795 mIU/mL  7 Weeks       3,697 - 163,563 mIU/mL  8 Weeks      32,065 - 149,571 mIU/mL  9 Weeks      63,803 - 151,410 mIU/mL  10 Weeks     46,509 - " 186,977 mIU/mL  12 Weeks     27,832 - 210,612 mIU/mL  14 Weeks     13,950 -  62,530 mIU/mL  15 Weeks     12,039 -  70,971 mIU/mL  16 Weeks      9,040 -  56,451 mIU/mL  17 Weeks      8,175 -  55,868 mIU/mL  18 Weeks      8,099 -  58,176 mIU/mL  Results may be falsely decreased if patient taking Biotin.      Lactic Acid, Plasma [[id number]]  (Normal) Collected: 02/14/21 2243    Specimen: Blood Updated: 02/14/21 2325     Lactate 0.7 mmol/L     Blood Culture - Blood, Hand, Right [[id number]] Collected: 02/14/21 2244    Specimen: Blood from Hand, Right Updated: 02/14/21 2305    Extra Tubes [[id number]] Collected: 02/14/21 1700    Specimen: Blood, Venous Line Updated: 02/14/21 1800    Narrative:      The following orders were created for panel order Extra Tubes.  Procedure                               Abnormality         Status                     ---------                               -----------         ------                     Light Blue Top[[id number]]                                   Final result               Gold Top - SST[[id number]]                                   Final result                 Please view results for these tests on the individual orders.    Light Blue Top [[id number]] Collected: 02/14/21 1700    Specimen: Blood Updated: 02/14/21 1800     Extra Tube hold for add-on     Comment: Auto resulted       Gold Top - SST [[id number]] Collected: 02/14/21 1700    Specimen: Blood Updated: 02/14/21 1800     Extra Tube Hold for add-ons.     Comment: Auto resulted.       Comprehensive Metabolic Panel [[id number]]  (Abnormal) Collected: 02/14/21 1700    Specimen: Blood Updated: 02/14/21 1723     Glucose 88 mg/dL      BUN 12 mg/dL      Creatinine 0.79 mg/dL      Sodium 141 mmol/L      Potassium 3.9 mmol/L      Chloride 106 mmol/L      CO2 24.0 mmol/L      Calcium 9.8 mg/dL      Total Protein 7.7 g/dL      Albumin 4.70 g/dL      ALT (SGPT) 67 U/L      AST (SGOT) 50 U/L      Alkaline Phosphatase 74 U/L      Total  Bilirubin 0.2 mg/dL      eGFR Non African Amer 78 mL/min/1.73      Globulin 3.0 gm/dL      A/G Ratio 1.6 g/dL      BUN/Creatinine Ratio 15.2     Anion Gap 11.0 mmol/L     Narrative:      GFR Normal >60  Chronic Kidney Disease <60  Kidney Failure <15      Lipase [[id number]]  (Normal) Collected: 02/14/21 1700    Specimen: Blood Updated: 02/14/21 1723     Lipase 51 U/L     Urinalysis With Culture If Indicated - Urine, Clean Catch [[id number]]  (Normal) Collected: 02/14/21 1659    Specimen: Urine, Clean Catch Updated: 02/14/21 1707     Color, UA Yellow     Appearance, UA Clear     pH, UA <=5.0     Specific Gravity, UA 1.011     Glucose, UA Negative     Ketones, UA Negative     Bilirubin, UA Negative     Blood, UA Negative     Protein, UA Negative     Leuk Esterase, UA Negative     Nitrite, UA Negative     Urobilinogen, UA 0.2 E.U./dL    Narrative:      Urine microscopic not indicated.    CBC & Differential [[id number]]  (Abnormal) Collected: 02/14/21 1700    Specimen: Blood Updated: 02/14/21 1705    Narrative:      The following orders were created for panel order CBC & Differential.  Procedure                               Abnormality         Status                     ---------                               -----------         ------                     CBC Auto Differential[[id number]]        Abnormal            Final result                 Please view results for these tests on the individual orders.    CBC Auto Differential [[id number]]  (Abnormal) Collected: 02/14/21 1700    Specimen: Blood Updated: 02/14/21 1705     WBC 12.10 10*3/mm3      RBC 4.10 10*6/mm3      Hemoglobin 13.0 g/dL      Hematocrit 37.4 %      MCV 91.2 fL      MCH 31.7 pg      MCHC 34.7 g/dL      RDW 13.2 %      RDW-SD 42.4 fl      MPV 6.9 fL      Platelets 434 10*3/mm3      Neutrophil % 77.9 %      Lymphocyte % 13.5 %      Monocyte % 7.1 %      Eosinophil % 1.0 %      Basophil % 0.5 %      Neutrophils, Absolute 9.40 10*3/mm3      Lymphocytes,  Absolute 1.60 10*3/mm3      Monocytes, Absolute 0.90 10*3/mm3      Eosinophils, Absolute 0.10 10*3/mm3      Basophils, Absolute 0.10 10*3/mm3      nRBC 0.1 /100 WBC           Imaging Results (Last 24 Hours)     Procedure Component Value Units Date/Time    CT Abdomen Pelvis With Contrast [[id number]] Collected: 02/14/21 1829     Updated: 02/14/21 1834    Narrative:      DATE OF EXAM:  2/14/2021 5:54 PM     PROCEDURE:  CT ABDOMEN PELVIS W CONTRAST-     INDICATIONS:   Right lower quadrant abdominal and flank pain, previous hysterectomy and  cholecystectomy.     COMPARISON:   No Comparisons Available     TECHNIQUE:  Routine transaxial slices were obtained through the abdomen and pelvis  after the intravenous administration of 100 mL of Isovue 370.  Reconstructed coronal and sagittal images were also obtained. Automated  exposure control and iterative construction methods were used.     FINDINGS:  The gallbladder is surgically absent. The liver, pancreas, adrenal  glands, kidneys, and spleen are normal. The appendix is within range of  normal. The patient has colonic diverticulosis. There is a focal area of  wall thickening and pericolonic inflammatory changes involving the  proximal-mid sigmoid colon consistent with acute diverticulitis. There  may be a small localized perforation with no discrete abscess formation.  A tiny amount of free pelvic cul-de-sac fluid is noted. The uterus is  surgically absent. The urinary bladder is unremarkable. There is normal  vascular enhancement. There is mild dependent atelectasis in the lung  bases. There are no suspicious osteolytic or sclerotic lesions within  the bony structures.        Impression:         1. Focal acute diverticulitis of the proximal-mid sigmoid colon. There  may be a small localized perforation with no discrete abscess formation.  2. The appendix is normal.  3. Tiny amount of free pelvic cul-de-sac fluid.     Electronically Signed By-Brad Emery MD On:2/14/2021  6:32 PM  This report was finalized on [id number] by  Brad Emery MD.          Results Review:    I reviewed the patient's new clinical results.  I reviewed the patient's new imaging results and agree with the interpretation.    Assessment/Plan       Perforation of sigmoid colon due to diverticulitis    Fatigue    IBS (irritable bowel syndrome)    Obesity (BMI 30.0-34.9)    History of benign colon tumor    Acetaminophen abuse      Is a 45-year-old lady who presents with sigmoid diverticulitis.  This is her first episode of diverticulitis that we know of.  She has had some pain in the past that is thought to be attributable to endometriosis.  She is never had a colonoscopy and has a family history of intestinal problems.  She has been started on antibiotics and her leukocytosis has resolved.  She says that her pain and pressure with urination has improved.  She has very tender on exam.  I will start a clear liquid diet but continue antibiotics.  I explained to her that diverticulitis can usually be treated with antibiotics in the situation but it can progress to a surgical disease requiring colon resection and even result in complications such as colostomy and anastomotic leak.    Natalie Guzman MD  02/15/21  16:15 EST

## 2021-02-15 NOTE — PLAN OF CARE
Goal Outcome Evaluation:        Outcome Summary: Pt with continued pain, treated wth PRN IV orders. Pt evaluated per Dr. Guzman, clear liquid diet initiated. VSS. Plan ongoing.

## 2021-02-15 NOTE — PROGRESS NOTES
Contacted by patient's nurse, CAT scan read by surgery they will see in the a.m.  Patient having severe pain, unrelieved by morphine, received partial relief with hydromorphone in the ER, continue IV hydromorphone.

## 2021-02-15 NOTE — PLAN OF CARE
Problem: Pain Acute  Goal: Optimal Pain Control  2/15/2021 0353 by Katrin Curtis RN  Outcome: Ongoing, Progressing  2/14/2021 2243 by Katrin Curtis RN  Outcome: Ongoing, Progressing  Intervention: Develop Pain Management Plan  Recent Flowsheet Documentation  Taken 2/15/2021 0100 by Katrin Curtis RN  Pain Management Interventions: see MAR  Taken 2/14/2021 2300 by Katrin Curtis RN  Pain Management Interventions:   breathing exercises   care clustered   diversional activity provided   pillow support provided   position adjusted   see MAR  Intervention: Optimize Psychosocial Wellbeing  Recent Flowsheet Documentation  Taken 2/14/2021 2300 by Katrin Curtis RN  Diversional Activities: television     Problem: Adult Inpatient Plan of Care  Goal: Plan of Care Review  Outcome: Ongoing, Progressing  Flowsheets (Taken 2/15/2021 0353)  Progress: improving  Plan of Care Reviewed With:   patient   spouse  Outcome Summary: Pain much improved with additiona of dilaudid. Awaiting surgery consult. Vitals stable.  Goal: Patient-Specific Goal (Individualized)  Outcome: Ongoing, Progressing  Goal: Absence of Hospital-Acquired Illness or Injury  Outcome: Ongoing, Progressing  Intervention: Identify and Manage Fall Risk  Recent Flowsheet Documentation  Taken 2/15/2021 0300 by Katrin Curtis RN  Safety Promotion/Fall Prevention: safety round/check completed  Taken 2/15/2021 0100 by Katrin Curtis RN  Safety Promotion/Fall Prevention: safety round/check completed  Taken 2/14/2021 2300 by Katrin Curtis RN  Safety Promotion/Fall Prevention: safety round/check completed  Intervention: Prevent Skin Injury  Recent Flowsheet Documentation  Taken 2/15/2021 0300 by Katrin Curtis RN  Body Position: position changed independently  Taken 2/15/2021 0100 by Katrin Curtis RN  Body Position: position changed independently  Taken 2/14/2021 2300 by Katrin Curtis RN  Body Position: position changed independently  Intervention: Prevent Infection  Recent  Flowsheet Documentation  Taken 2/15/2021 0300 by Katrin Curtis, RN  Infection Prevention:   single patient room provided   rest/sleep promoted   personal protective equipment utilized   hand hygiene promoted   equipment surfaces disinfected   environmental surveillance performed   cohorting utilized  Goal: Optimal Comfort and Wellbeing  Outcome: Ongoing, Progressing  Intervention: Provide Person-Centered Care  Recent Flowsheet Documentation  Taken 2/14/2021 2300 by Katrin Curtis, RN  Trust Relationship/Rapport:   care explained   choices provided   emotional support provided   empathic listening provided   questions answered   questions encouraged   reassurance provided   thoughts/feelings acknowledged  Goal: Readiness for Transition of Care  Outcome: Ongoing, Progressing  Intervention: Mutually Develop Transition Plan  Recent Flowsheet Documentation  Taken 2/14/2021 2244 by Katrin Curtis, RN  Equipment Currently Used at Home: none  Transportation Anticipated: family or friend will provide  Patient/Family Anticipated Services at Transition: none  Patient/Family Anticipates Transition to: home with family   Goal Outcome Evaluation:  Plan of Care Reviewed With: patient, spouse  Progress: improving  Outcome Summary: Pain much improved with additiona of dilaudid. Awaiting surgery consult. Vitals stable.

## 2021-02-15 NOTE — H&P
"      [facility] Medicine Services      Patient Name: Mary Gentile  : 1975  MRN: [id number]  Primary Care Physician: Sergio Clifford MD  Date of admission: 2021    Patient Care Team:  Sergio Clifford MD as PCP - General (Family Medicine)          Subjective   History Present Illness     Chief Complaint:   Chief Complaint   Patient presents with   • Abdominal Pain     Complaint: Abdominal pain    History of present illness:    Patient is a 45-year-old female that presents to [facility] ED with complaints of sharp severe consistent right lower quadrant pain radiating to lower back that began 3 days prior as dull, cramping moderate pain.  Decreased appetite and oral intake for the past 48 hours with nausea, patient denies fever or chills.. Patient presented with temp of 97.7, heart rate of 103, BP of 148/82, O2 sat 100% on room air, glucose 88.  , K3.9, BUN 12, creatinine 0.79, ALT 67, AST 50, total LE 0.2, lipase 51, WBC 12.1, hemoglobin 13.0, neutral elevation 77.9.  UA is negative for ketones, nitrites, or bacteria.  Patient takes 3 Excedrin Migraine tablets daily for migraines which she states are type stable, iron for fatigue which is stable as well.. CT the abdomen and pelvis reveals absence of gallbladder, normal appendix, colonic diverticulosis, focal acute diverticulitis of the proximal mid sigmoid colon, small localized perforation with no discrete abscess formation, tiny amount of free pelvic cul-de-sac fluid.  Surgery has been consulted to evaluate.  Patient states approximately 10 years prior she had surgery at [facility] for a tumor in her left lower abdomen which she said was a \"fatty tumor \".  She is unaware of any disease process or negative pathology results from that procedure.  Patient stated that after her last  the OB physician told her her \"organs were fused together \"and that he had to \"rebuild her abdomen \". Admitted " "for surgical evaluation, IV antibiotics, and pain management      History of Present Illness    Review of Systems   Constitution: Positive for decreased appetite and malaise/fatigue.   HENT: Negative.    Eyes: Negative.    Cardiovascular: Negative.    Respiratory: Negative.    Endocrine: Negative.    Hematologic/Lymphatic: Negative.    Skin: Negative.    Musculoskeletal: Negative.    Gastrointestinal: Positive for abdominal pain and nausea.   Genitourinary: Positive for pelvic pain.   Neurological: Negative.    Psychiatric/Behavioral: Negative.    Allergic/Immunologic: Negative.    All other systems reviewed and are negative.          Personal History     Past Medical History:   Past Medical History:   Diagnosis Date   • Acetaminophen abuse     Takes 3 Excedrin Migraine daily   • Hypertension    • IBS (irritable bowel syndrome)    • Migraines        Surgical History:      Past Surgical History:   Procedure Laterality Date   • ABDOMINAL SURGERY      Patient states that after last  section, she was told that her organs had fused together and the surgeon had to \"rebuild \"her abdomen.   •  SECTION     • CHOLECYSTECTOMY     • HYSTERECTOMY     • TUMOR REMOVAL Left     Fatty tumor left lower abdomen           Family History: Mother had ovarian cancer, reports several other 2nd degree relatives with ovarian cancer as well.    Social History:  reports that she quit smoking about 4 years ago. She has a 20.00 pack-year smoking history. She has never used smokeless tobacco. She reports previous alcohol use. She reports that she does not use drugs.      Medications:  Prior to Admission medications    Not on File       Allergies:    Allergies   Allergen Reactions   • Metformin Diarrhea   • Penicillins Unknown - High Severity       Objective   Objective     Vital Signs  Temp:  [97.7 °F (36.5 °C)-98.5 °F (36.9 °C)] 98.1 °F (36.7 °C)  Heart Rate:  [] 84  Resp:  [16-18] 18  BP: (111-148)/(60-82) 111/74  SpO2:  " [98 %-100 %] 98 %  on   ;   Device (Oxygen Therapy): room air  Body mass index is 32.18 kg/m².    Physical Exam  Vitals signs and nursing note reviewed.   Constitutional:       General: She is in acute distress.      Appearance: She is obese.   HENT:      Head: Normocephalic.      Nose: Nose normal.      Mouth/Throat:      Mouth: Mucous membranes are dry.   Eyes:      Pupils: Pupils are equal, round, and reactive to light.   Neck:      Musculoskeletal: Normal range of motion.   Cardiovascular:      Rate and Rhythm: Normal rate and regular rhythm.      Pulses: Normal pulses.      Heart sounds: Normal heart sounds.   Pulmonary:      Effort: Pulmonary effort is normal.      Breath sounds: Normal breath sounds.   Abdominal:      General: There is distension.      Tenderness: There is abdominal tenderness. There is right CVA tenderness and guarding.   Skin:     General: Skin is dry.      Coloration: Skin is pale.   Neurological:      Mental Status: She is oriented to person, place, and time. Mental status is at baseline.   Psychiatric:         Behavior: Behavior normal.         Thought Content: Thought content normal.           Results Review:  I have personally reviewed most recent cardiac tracings or radiographic results and agree with findings..    Results from last 7 days   Lab Units 02/14/21  1700   WBC 10*3/mm3 12.10*   HEMOGLOBIN g/dL 13.0   HEMATOCRIT % 37.4   PLATELETS 10*3/mm3 434     Results from last 7 days   Lab Units 02/14/21  2243 02/14/21  1700   SODIUM mmol/L  --  141   POTASSIUM mmol/L  --  3.9   CHLORIDE mmol/L  --  106   CO2 mmol/L  --  24.0   BUN mg/dL  --  12   CREATININE mg/dL  --  0.79   GLUCOSE mg/dL  --  88   CALCIUM mg/dL  --  9.8   ALT (SGPT) U/L  --  67*   AST (SGOT) U/L  --  50*   LACTATE mmol/L 0.7  --    PROCALCITONIN ng/mL 0.04  --      Estimated Creatinine Clearance: 87.1 mL/min (by C-G formula based on SCr of 0.79 mg/dL).  Brief Urine Lab Results  (Last result in the past 365 days)       Color   Clarity   Blood   Leuk Est   Nitrite   Protein   CREAT   Urine HCG        02/14/21 1659 Yellow Clear Negative Negative Negative Negative               Microbiology Results (last 10 days)     ** No results found for the last 240 hours. **          ECG/EMG Results (most recent)     None                    Ct Abdomen Pelvis With Contrast    Result Date: 2/14/2021   1. Focal acute diverticulitis of the proximal-mid sigmoid colon. There may be a small localized perforation with no discrete abscess formation. 2. The appendix is normal. 3. Tiny amount of free pelvic cul-de-sac fluid.  Electronically Signed By-Brad Emery MD On:2/14/2021 6:32 PM This report was finalized on [id number] by  Brad Emery MD.        Estimated Creatinine Clearance: 87.1 mL/min (by C-G formula based on SCr of 0.79 mg/dL).    Assessment/Plan   Assessment/Plan       Active Hospital Problems    Diagnosis  POA   • **Perforation of sigmoid colon due to diverticulitis [K57.20]  Yes     Priority: High   • IBS (irritable bowel syndrome) [K58.9]  Yes     Priority: High   • Obesity (BMI 30.0-34.9) [E66.9]  Yes     Priority: High   • History of benign colon tumor [Z86.018]  Not Applicable     Priority: High   • Acetaminophen abuse [F55.8]  Yes   • Fatigue [R53.83]  Yes      Resolved Hospital Problems   No resolved problems to display.     Acute diverticulitis/perforation  -CT of the abdomen reveals colonic diverticulosis, acute diverticulitis of the sigmoid colon, and small    localized perforation  -Stat surgery consult  -Patient received 2 L fluid bolus in the ER, continue normal saline at 100 mils an hour  -N.p.o. for possible surgical procedure  -IV narcotics to manage severe pain  -Cefepime IV and IV Flagyl  -Continuous cardiac monitoring  -Vital signs every 4  -Trend lactic acids  -Trend WBC  -Vital signs every 4      IBS/obesity/fatigue  -Encourage lifestyle modifications  -Hold ferrous sulfate for possible surgical procedure in the  morning    Acetaminophen abuse:  -Acetaminophen level, if normal can reorder  -Educated on the dangers of acetaminophen overdose          VTE Prophylaxis -   Mechanical Order History:     None      Pharmalogical Order History:     None          CODE STATUS:    Code Status and Medical Interventions:   Ordered at: 02/14/21 2124     Level Of Support Discussed With:    Patient     Code Status:    CPR     Medical Interventions (Level of Support Prior to Arrest):    Full       This patient has been interviewed wearing appropriate personal protective equipment and discussed with the ED physician.    I discussed the patient's findings and my recommendations with the patient.    Signature:Electronically signed by REJI Harper, 02/15/21, 3:17 AM EST.      [facility] Hospitalist Team

## 2021-02-15 NOTE — ED PROVIDER NOTES
Subjective   45-year-old female complaining of abdominal pain increasing over the last 3 days.  She states she had the onset of fever and chills today.  She states has been somewhat anorexic today.  She reports that she has had nausea with the pain specialist the pain has become worse.  She reports no vomiting or diarrhea.  She denies melena hematemesis hematochezia.  She denies dysuria hematuria.          Review of Systems   Gastrointestinal: Positive for abdominal pain and nausea. Negative for abdominal distention, blood in stool, constipation and rectal pain.       No past medical history on file.  The patient has a history of previous hysterectomy and unilateral oophorectomy.  No previous history of inflammatory bowel disease but does have a history of irritable bowel syndrome with a recent change from diarrhea to constipation  Allergies   Allergen Reactions   • Penicillins Unknown - High Severity       No past surgical history on file.    No family history on file.    Social History     Socioeconomic History   • Marital status:      Spouse name: Not on file   • Number of children: Not on file   • Years of education: Not on file   • Highest education level: Not on file     Social history no unusual food water travel or activity      Objective   Physical Exam  Alert [place] Coma Scale 15   HEENT: Pupils equal and reactive to light. Conjunctivae are not injected. normal tympanic membranes. Oropharynx and nares are normal.   Neck: Supple. Midline trachea. No JVD. No goiter.   Chest: Clear and equal breath sounds bilaterally regular rate and rhythm without murmur or rub.   Abdomen: Positive bowel sounds tender left lower quadrant.  Is positive reverse Rovsing sign.  The abdomen is  clinically nondistended. No rebound or peritoneal signs. No CVA tenderness.   Extremities no clubbing cyanosis or edema motor sensory exam is normal the full range of motion is intact   skin: Warm and dry, no rashes or petechia.    Lymphatic: No regional lymphadenopathy. No calf pain, swelling or Berlin's sign    Procedures           ED Course  ED Course as of Feb 14 1942   Sun Feb 14, 2021 1932 I examined the patient using the appropriate personal protective equipment.          [TH]      ED Course User Index  [TH] Marek Turner MD                                   Labs Reviewed   COMPREHENSIVE METABOLIC PANEL - Abnormal; Notable for the following components:       Result Value    ALT (SGPT) 67 (*)     AST (SGOT) 50 (*)     All other components within normal limits    Narrative:     GFR Normal >60  Chronic Kidney Disease <60  Kidney Failure <15     CBC WITH AUTO DIFFERENTIAL - Abnormal; Notable for the following components:    WBC 12.10 (*)     Neutrophil % 77.9 (*)     Lymphocyte % 13.5 (*)     Neutrophils, Absolute 9.40 (*)     All other components within normal limits   LIPASE - Normal   URINALYSIS W/ CULTURE IF INDICATED - Normal    Narrative:     Urine microscopic not indicated.   CBC AND DIFFERENTIAL    Narrative:     The following orders were created for panel order CBC & Differential.  Procedure                               Abnormality         Status                     ---------                               -----------         ------                     CBC Auto Differential[[id number]]        Abnormal            Final result                 Please view results for these tests on the individual orders.   EXTRA TUBES    Narrative:     The following orders were created for panel order Extra Tubes.  Procedure                               Abnormality         Status                     ---------                               -----------         ------                     Light Blue Top[[id number]]                                   Final result               Gold Top - [facility][[id number]]                                   Final result                 Please view results for these tests on the individual orders.   LIGHT BLUE TOP   GOLD  TOP - SST     Medications   HYDROmorphone (DILAUDID) injection 0.5 mg (has no administration in time range)   sodium chloride 0.9 % bolus 1,000 mL (has no administration in time range)   levoFLOXacin (LEVAQUIN) 750 mg/150 mL D5W (premix) (LEVAQUIN) 750 mg (has no administration in time range)   metroNIDAZOLE (FLAGYL) 500 mg/100mL IVPB (has no administration in time range)   sodium chloride 0.9 % bolus 1,000 mL (0 mL Intravenous Stopped 2/14/21 1904)   HYDROmorphone (DILAUDID) injection 0.5 mg (0.5 mg Intravenous Given 2/14/21 1705)   ondansetron (ZOFRAN) injection 4 mg (4 mg Intravenous Given 2/14/21 1705)   iopamidol (ISOVUE-370) 76 % injection 100 mL (100 mL Intravenous Given 2/14/21 1813)     Ct Abdomen Pelvis With Contrast    Result Date: 2/14/2021   1. Focal acute diverticulitis of the proximal-mid sigmoid colon. There may be a small localized perforation with no discrete abscess formation. 2. The appendix is normal. 3. Tiny amount of free pelvic cul-de-sac fluid.  Electronically Signed By-Brad Emery MD On:2/14/2021 6:32 PM This report was finalized on [id number] by  Brad Emery MD.            MDM  Number of Diagnoses or Management Options     Amount and/or Complexity of Data Reviewed  Clinical lab tests: reviewed  Tests in the radiology section of CPT®: reviewed  Discuss the patient with other providers: yes  Independent visualization of images, tracings, or specimens: yes    Risk of Complications, Morbidity, and/or Mortality  Presenting problems: high  Diagnostic procedures: high  Management options: high  General comments: The patient was placed on Levaquin and Flagyl after discussion with pharmacy.  The patient's pain and nausea were relieved in the emergency department.  The patient reports she may have been hurting for more than 3 days but with her irritable bowel syndrome she is not a stranger to abdominal discomfort.  The case discussed the hospitalist practitioner and we also request surgery  consultation morning patient was agreeable this plan of treatment        Final diagnoses:   Perforation of sigmoid colon due to diverticulitis   Acute left lower quadrant pain   History of irritable bowel syndrome            Marek Turner MD  02/14/21 1942

## 2021-02-15 NOTE — PROGRESS NOTES
Discharge Planning Assessment  [facility]     Patient Name: Mary Gentile  MRN: [id number]  Today's Date: 2/15/2021    Admit Date: 2/14/2021    Discharge Needs Assessment     Row Name 02/15/21 1507       Living Environment    Lives With  spouse    Current Living Arrangements  home/apartment/condo    Primary Care Provided by  self    Provides Primary Care For  no one    Family Caregiver if Needed  spouse    Quality of Family Relationships  helpful    Able to Return to Prior Arrangements  yes       Resource/Environmental Concerns    Resource/Environmental Concerns  none    Transportation Concerns  car, none       Transition Planning    Patient/Family Anticipates Transition to  home with family    Patient/Family Anticipated Services at Transition  none    Transportation Anticipated  family or friend will provide       Discharge Needs Assessment    Readmission Within the Last 30 Days  no previous admission in last 30 days    Equipment Currently Used at Home  none    Concerns to be Addressed  discharge planning;no discharge needs identified    Anticipated Changes Related to Illness  none    Equipment Needed After Discharge  none        Discharge Plan     Row Name 02/15/21 1507       Plan    Plan  Anticipate routine home    Patient/Family in Agreement with Plan  yes    Plan Comments  Attempted to meet with patient at bedside, she is was upset regarding her care and asked to speak to nursing supervisor and asked I come back to discuss d/c plan. Informed bedside RN regarding patient request. Assessment completed via  chart review and rounds. No d/c needs identified at this time. DC barriers: general surgery consult          Expected Discharge Date and Time     Expected Discharge Date Expected Discharge Time    Feb 17, 2021         Demographic Summary     Row Name 02/15/21 1507       General Information    Admission Type  observation    Arrived From  emergency department    Referral Source  admission list    Reason for  Consult  discharge planning    Preferred Language  English        Functional Status     Row Name 02/15/21 1507       Functional Status    Usual Activity Tolerance  good    Current Activity Tolerance  good       Functional Status, IADL    Medications  independent    Meal Preparation  independent    Housekeeping  independent    Laundry  independent    Shopping  independent        Met with patient in room wearing PPE: mask, goggles.    Maintained distance greater than six feet and spent less than 15 minutes in the room.          Malgorzata Schaeffer RN

## 2021-02-16 VITALS
TEMPERATURE: 97.4 F | SYSTOLIC BLOOD PRESSURE: 126 MMHG | HEIGHT: 62 IN | RESPIRATION RATE: 16 BRPM | HEART RATE: 82 BPM | WEIGHT: 169.09 LBS | DIASTOLIC BLOOD PRESSURE: 76 MMHG | OXYGEN SATURATION: 99 % | BODY MASS INDEX: 31.12 KG/M2

## 2021-02-16 LAB
BILIRUB UR QL STRIP: NEGATIVE
CLARITY UR: CLEAR
COLOR UR: YELLOW
D-LACTATE SERPL-SCNC: 0.8 MMOL/L (ref 0.5–2)
GLUCOSE UR STRIP-MCNC: NEGATIVE MG/DL
HGB UR QL STRIP.AUTO: NEGATIVE
KETONES UR QL STRIP: NEGATIVE
LEUKOCYTE ESTERASE UR QL STRIP.AUTO: NEGATIVE
NITRITE UR QL STRIP: NEGATIVE
PH UR STRIP.AUTO: 5.5 [PH] (ref 5–8)
PROT UR QL STRIP: NEGATIVE
SP GR UR STRIP: 1.01 (ref 1–1.03)
UROBILINOGEN UR QL STRIP: NORMAL

## 2021-02-16 PROCEDURE — 99217 PR OBSERVATION CARE DISCHARGE MANAGEMENT: CPT | Performed by: HOSPITALIST

## 2021-02-16 PROCEDURE — 81003 URINALYSIS AUTO W/O SCOPE: CPT | Performed by: HOSPITALIST

## 2021-02-16 PROCEDURE — G0378 HOSPITAL OBSERVATION PER HR: HCPCS

## 2021-02-16 PROCEDURE — 25010000002 MORPHINE PER 10 MG: Performed by: HOSPITALIST

## 2021-02-16 PROCEDURE — 96366 THER/PROPH/DIAG IV INF ADDON: CPT

## 2021-02-16 PROCEDURE — 99214 OFFICE O/P EST MOD 30 MIN: CPT | Performed by: SURGERY

## 2021-02-16 PROCEDURE — 83605 ASSAY OF LACTIC ACID: CPT | Performed by: NURSE PRACTITIONER

## 2021-02-16 PROCEDURE — 96376 TX/PRO/DX INJ SAME DRUG ADON: CPT

## 2021-02-16 PROCEDURE — 25010000002 CEFEPIME PER 500 MG: Performed by: NURSE PRACTITIONER

## 2021-02-16 RX ORDER — HYDROCODONE BITARTRATE AND ACETAMINOPHEN 5; 325 MG/1; MG/1
1 TABLET ORAL EVERY 6 HOURS PRN
Status: DISCONTINUED | OUTPATIENT
Start: 2021-02-16 | End: 2021-02-16 | Stop reason: HOSPADM

## 2021-02-16 RX ORDER — LEVOFLOXACIN 750 MG/1
750 TABLET ORAL DAILY
Qty: 14 TABLET | Refills: 0 | Status: SHIPPED | OUTPATIENT
Start: 2021-02-16 | End: 2022-12-21 | Stop reason: ALTCHOICE

## 2021-02-16 RX ORDER — POLYETHYLENE GLYCOL 3350 17 G/17G
17 POWDER, FOR SOLUTION ORAL DAILY
Status: DISCONTINUED | OUTPATIENT
Start: 2021-02-16 | End: 2021-02-16 | Stop reason: HOSPADM

## 2021-02-16 RX ORDER — AMOXICILLIN 250 MG
2 CAPSULE ORAL DAILY
Qty: 60 TABLET | Refills: 2 | Status: SHIPPED | OUTPATIENT
Start: 2021-02-16 | End: 2022-12-21 | Stop reason: ALTCHOICE

## 2021-02-16 RX ORDER — HYDROCODONE BITARTRATE AND ACETAMINOPHEN 5; 325 MG/1; MG/1
1 TABLET ORAL EVERY 4 HOURS PRN
Qty: 10 TABLET | Refills: 0 | Status: SHIPPED | OUTPATIENT
Start: 2021-02-16 | End: 2022-12-21 | Stop reason: ALTCHOICE

## 2021-02-16 RX ORDER — METRONIDAZOLE 500 MG/1
500 TABLET ORAL 3 TIMES DAILY
Qty: 42 TABLET | Refills: 0 | Status: SHIPPED | OUTPATIENT
Start: 2021-02-16 | End: 2022-12-21 | Stop reason: ALTCHOICE

## 2021-02-16 RX ADMIN — METRONIDAZOLE 500 MG: 500 INJECTION, SOLUTION INTRAVENOUS at 04:09

## 2021-02-16 RX ADMIN — POLYETHYLENE GLYCOL 3350 17 G: 17 POWDER, FOR SOLUTION ORAL at 11:45

## 2021-02-16 RX ADMIN — CEFEPIME HYDROCHLORIDE 2 G: 2 INJECTION, POWDER, FOR SOLUTION INTRAVENOUS at 05:10

## 2021-02-16 RX ADMIN — MORPHINE SULFATE 2 MG: 4 INJECTION INTRAVENOUS at 01:35

## 2021-02-16 RX ADMIN — HYDROCODONE BITARTRATE AND ACETAMINOPHEN 1 TABLET: 5; 325 TABLET ORAL at 11:45

## 2021-02-16 NOTE — PROGRESS NOTES
Continued Stay Note  [facility]     Patient Name: Mary Gentile  MRN: [id number]  Today's Date: 2/16/2021    Admit Date: 2/14/2021    Discharge Plan     Row Name 02/16/21 1027       Plan    Plan  D/C Plan: Anticipate routine home.    Plan Comments  Barrier to D/C: advancing diet, IV abx, IVFs.          Expected Discharge Date and Time     Expected Discharge Date Expected Discharge Time    Feb 16, 2021             Lisandra Godoy

## 2021-02-16 NOTE — PROGRESS NOTES
"      [facility] Medicine Services Daily Progress Note      Hospitalist Team  LOS 0 days      Patient Care Team:  Sergio Clifford MD as PCP - General (Family Medicine)    Patient Location: 4129/      Subjective   Subjective     Chief Complaint / Subjective  Chief Complaint   Patient presents with   • Abdominal Pain       Present on Admission:  • Perforation of sigmoid colon due to diverticulitis  • IBS (irritable bowel syndrome)  • Fatigue  • Obesity (BMI 30.0-34.9)  • Acetaminophen abuse      Brief Synopsis of Hospital Course/HPI  Patient is a 45-year-old female that presents to [facility] ED with complaints of sharp severe consistent right lower quadrant pain radiating to lower back that began 3 days prior as dull, cramping moderate pain.  Decreased appetite and oral intake for the past 48 hours with nausea, patient denies fever or chills.. Patient presented with temp of 97.7, heart rate of 103, BP of 148/82, O2 sat 100% on room air, glucose 88.  , K3.9, BUN 12, creatinine 0.79, ALT 67, AST 50, total LE 0.2, lipase 51, WBC 12.1, hemoglobin 13.0, neutral elevation 77.9.  UA is negative for ketones, nitrites, or bacteria.  Patient takes 3 Excedrin Migraine tablets daily for migraines which she states are type stable, iron for fatigue which is stable as well.. CT the abdomen and pelvis reveals absence of gallbladder, normal appendix, colonic diverticulosis, focal acute diverticulitis of the proximal mid sigmoid colon, small localized perforation with no discrete abscess formation, tiny amount of free pelvic cul-de-sac fluid.  Surgery has been consulted to evaluate.  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  She is unaware of any disease process or negative pathology results from that procedure.  Patient stated that after her last  the OB physician told " "her her \"organs were fused together \"and that he had to \"rebuild her abdomen \". Admitted for surgical evaluation, IV antibiotics, and pain management     Date::    2/15/2021  Reporting severe HA, nausea and vomiting this AM, reports hx of migraines.      ROS  10 point ROS reviewed, negative unless otherwise stated     Objective   Objective      Vital Signs  Temp:  [97.7 °F (36.5 °C)-98.5 °F (36.9 °C)] 98 °F (36.7 °C)  Heart Rate:  [78-94] 78  Resp:  [14-18] 15  BP: (103-122)/(57-82) 105/57  Oxygen Therapy  SpO2: 99 %  Pulse Oximetry Type: Intermittent  Device (Oxygen Therapy): room air  Flowsheet Rows      First Filed Value   Admission Height  157.5 cm (62\") Documented at 02/14/2021 1613   Admission Weight  77.3 kg (170 lb 6.7 oz) Documented at 02/14/2021 1613        Intake & Output (last 3 days)       02/13 0701 - 02/14 0700 02/14 0701 - 02/15 0700 02/15 0701 - 02/16 0700    P.O.   360    I.V. (mL/kg)   2000 (25.1)    IV Piggyback  1000     Total Intake(mL/kg)  1000 (12.5) 2360 (29.6)    Urine (mL/kg/hr)   0 (0)    Emesis/NG output   300    Total Output   300    Net  +1000 +2060           Urine Unmeasured Occurrence   4 x        Lines, Drains & Airways    Active LDAs     Name:   Placement date:   Placement time:   Site:   Days:    Peripheral IV 02/14/21 1705 Left Antecubital   02/14/21 1705    Antecubital   1                  Physical Exam:    Physical Exam  Constitutional:       General: She is not in acute distress.     Appearance: She is well-developed. She is not diaphoretic.   HENT:      Head: Normocephalic and atraumatic.      Nose: Nose normal.      Mouth/Throat:      Pharynx: No oropharyngeal exudate.   Eyes:      General:         Right eye: No discharge.         Left eye: No discharge.      Conjunctiva/sclera: Conjunctivae normal.      Pupils: Pupils are equal, round, and reactive to light.   Neck:      Musculoskeletal: Normal range of motion and neck supple.      Trachea: No tracheal deviation. "   Cardiovascular:      Rate and Rhythm: Normal rate and regular rhythm.      Heart sounds: Normal heart sounds. No murmur. No friction rub. No gallop.    Pulmonary:      Effort: Pulmonary effort is normal. No respiratory distress.      Breath sounds: Normal breath sounds. No stridor. No wheezing or rales.   Chest:      Chest wall: No tenderness.   Abdominal:      General: Bowel sounds are normal. There is no distension.      Palpations: Abdomen is soft.      Tenderness: There is abdominal tenderness. There is no guarding.   Musculoskeletal: Normal range of motion.         General: No tenderness or deformity.   Skin:     General: Skin is warm and dry.      Coloration: Skin is not pale.      Findings: No erythema or rash.   Neurological:      Mental Status: She is alert and oriented to person, place, and time.      Cranial Nerves: No cranial nerve deficit.   Psychiatric:         Behavior: Behavior normal.         Thought Content: Thought content normal.         Judgment: Judgment normal.           Procedures:              Results Review:     I reviewed the patient's new clinical results.      Lab Results (last 24 hours)     Procedure Component Value Units Date/Time    COVID PRE-OP / PRE-PROCEDURE SCREENING ORDER (NO ISOLATION) - Swab, Nasopharynx [[id number]]  (Normal) Collected: 02/14/21 2039    Specimen: Swab from Nasopharynx Updated: 02/15/21 1422    Narrative:      The following orders were created for panel order COVID PRE-OP / PRE-PROCEDURE SCREENING ORDER (NO ISOLATION) - Swab, Nasopharynx.  Procedure                               Abnormality         Status                     ---------                               -----------         ------                     COVID-19,APTIMA PANTHER,...[[id number]]  Normal              Final result                 Please view results for these tests on the individual orders.    COVID-19,APTIMA [facility]SE, NP/OP SWAB IN UTM/VTM/SALINE TRANSPORT MEDIA,24 HR TAT - Swab,  Nasopharynx [[id number]]  (Normal) Collected: 02/14/21 2039    Specimen: Swab from Nasopharynx Updated: 02/15/21 1422     COVID19 Not Detected    Narrative:      Fact sheet for providers: https://www.fda.gov/media/560071/download     Fact sheet for patients: https://www.fda.gov/media/894211/download    Test performed by RT PCR.    Urine Drug Screen - Urine, Clean Catch [[id number]]  (Abnormal) Collected: 02/14/21 1659    Specimen: Urine, Clean Catch Updated: 02/15/21 1126     Amphet/Methamphet, Screen Positive     Barbiturates Screen, Urine Negative     Benzodiazepine Screen, Urine Negative     Cocaine Screen, Urine Negative     Opiate Screen Negative     THC, Screen, Urine Negative     Methadone Screen, Urine Negative     Oxycodone Screen, Urine Negative    Narrative:      Negative Thresholds For Drugs Screened:     Amphetamines               500 ng/ml   Barbiturates               200 ng/ml   Benzodiazepines            100 ng/ml   Cocaine                    300 ng/ml   Methadone                  300 ng/ml   Opiates                    300 ng/ml   Oxycodone                  100 ng/ml   THC                        50 ng/ml    The Normal Value for all drugs tested is negative. This report includes final unconfirmed screening results to be used for medical treatment purposes only. Unconfirmed results must not be used for non-medical purposes such as employment or legal testing. Clinical consideration should be applied to any drug of abuse test, particulary when unconfirmed results are used.  All urine drugs of abuse requests without chain of custody are for medical screening purposes only.  False positives are possible.      Pregnancy, Urine - Urine, Clean Catch [[id number]]  (Normal) Collected: 02/14/21 1659    Specimen: Urine, Clean Catch Updated: 02/15/21 1109     HCG, Urine QL Negative    Lactic Acid, Plasma [[id number]]  (Normal) Collected: 02/15/21 0333    Specimen: Blood Updated: 02/15/21 0401     Lactate 0.6 mmol/L      Comprehensive Metabolic Panel [[id number]]  (Abnormal) Collected: 02/15/21 0333    Specimen: Blood Updated: 02/15/21 0400     Glucose 81 mg/dL      BUN 8 mg/dL      Creatinine 0.77 mg/dL      Sodium 141 mmol/L      Potassium 3.7 mmol/L      Chloride 109 mmol/L      CO2 22.0 mmol/L      Calcium 8.8 mg/dL      Total Protein 6.1 g/dL      Albumin 3.70 g/dL      ALT (SGPT) 48 U/L      AST (SGOT) 32 U/L      Alkaline Phosphatase 59 U/L      Total Bilirubin 0.2 mg/dL      eGFR Non African Amer 81 mL/min/1.73      Globulin 2.4 gm/dL      A/G Ratio 1.5 g/dL      BUN/Creatinine Ratio 10.4     Anion Gap 10.0 mmol/L     Narrative:      GFR Normal >60  Chronic Kidney Disease <60  Kidney Failure <15      CBC & Differential [[id number]]  (Abnormal) Collected: 02/15/21 0333    Specimen: Blood Updated: 02/15/21 0343    Narrative:      The following orders were created for panel order CBC & Differential.  Procedure                               Abnormality         Status                     ---------                               -----------         ------                     CBC Auto Differential[[id number]]        Abnormal            Final result                 Please view results for these tests on the individual orders.    CBC Auto Differential [[id number]]  (Abnormal) Collected: 02/15/21 0333    Specimen: Blood Updated: 02/15/21 0343     WBC 6.90 10*3/mm3      RBC 3.67 10*6/mm3      Hemoglobin 11.4 g/dL      Hematocrit 33.8 %      MCV 92.2 fL      MCH 31.0 pg      MCHC 33.6 g/dL      RDW 13.2 %      RDW-SD 42.4 fl      MPV 7.1 fL      Platelets 371 10*3/mm3      Neutrophil % 49.6 %      Lymphocyte % 34.9 %      Monocyte % 10.6 %      Eosinophil % 3.9 %      Basophil % 1.0 %      Neutrophils, Absolute 3.40 10*3/mm3      Lymphocytes, Absolute 2.40 10*3/mm3      Monocytes, Absolute 0.70 10*3/mm3      Eosinophils, Absolute 0.30 10*3/mm3      Basophils, Absolute 0.10 10*3/mm3      nRBC 0.1 /100 WBC     Toxicology Screen, Serum  "[[id number]] Collected: 02/15/21 0333    Specimen: Blood Updated: 02/15/21 0339    Amylase [[id number]]  (Normal) Collected: 02/14/21 2243    Specimen: Blood Updated: 02/14/21 2344     Amylase 50 U/L     Hepatitis Panel, Acute [[id number]]  (Normal) Collected: 02/14/21 2243    Specimen: Blood Updated: 02/14/21 2344     Hepatitis B Surface Ag Non-Reactive     Hep A IgM Non-Reactive     Hep B C IgM Non-Reactive     Hepatitis C Ab Non-Reactive    Narrative:      Results may be falsely decreased if patient taking Biotin.     Acetaminophen Level [[id number]]  (Normal) Collected: 02/14/21 2243    Specimen: Blood Updated: 02/14/21 2344     Acetaminophen <5.0 mcg/mL     Narrative:      Acetaminophen Therapeutic Range  5-20 ug/mL      Hours after ingestion            Toxic Value    4 Hours                           150 ug/mL    8 Hours                            70 ug/mL   12 Hours                            40 ug/mL   16 Hours                            20 ug/mL    These values apply to a single ingestion only.     Procalcitonin [[id number]]  (Normal) Collected: 02/14/21 2243    Specimen: Blood Updated: 02/14/21 2332     Procalcitonin 0.04 ng/mL     Narrative:      As a Marker for Sepsis (Non-Neonates):   1. <0.5 ng/mL represents a low risk of severe sepsis and/or septic shock.  1. >2 ng/mL represents a high risk of severe sepsis and/or septic shock.    As a Marker for Lower Respiratory Tract Infections that require antibiotic therapy:  PCT on Admission     Antibiotic Therapy             6-12 Hrs later  > 0.5                Strongly Recommended            >0.25 - <0.5         Recommended  0.1 - 0.25           Discouraged                   Remeasure/reassess PCT  <0.1                 Strongly Discouraged          Remeasure/reassess PCT      As 28 day mortality risk marker: \"Change in Procalcitonin Result\" (> 80 % or <=80 %) if Day 0 (or Day 1) and Day 4 values are available. Refer to http://www.PEROR[employer]-pct-calculator.com/   Change " in PCT <=80 %   A decrease of PCT levels below or equal to 80 % defines a positive change in PCT test result representing a higher risk for 28-day all-cause mortality of patients diagnosed with severe sepsis or septic shock.  Change in PCT > 80 %   A decrease of PCT levels of more than 80 % defines a negative change in PCT result representing a lower risk for 28-day all-cause mortality of patients diagnosed with severe sepsis or septic shock.                Results may be falsely decreased if patient taking Biotin.     hCG, Quantitative, Pregnancy [[id number]] Collected: 02/14/21 2243    Specimen: Blood Updated: 02/14/21 2331     HCG Quantitative 3.56 mIU/mL     Narrative:      HCG Ranges by Gestational Age    Females - non-pregnant premenopausal   </= 1mIU/mL HCG  Females - postmenopausal               </= 7mIU/mL HCG    3 Weeks         5.8 -    71.2 mIU/mL  4 Weeks         9.5 -     750 mIU/mL  5 Weeks         217 -   7,138 mIU/mL  6 Weeks         158 -  31,795 mIU/mL  7 Weeks       3,697 - 163,563 mIU/mL  8 Weeks      32,065 - 149,571 mIU/mL  9 Weeks      63,803 - 151,410 mIU/mL  10 Weeks     46,509 - 186,977 mIU/mL  12 Weeks     27,832 - 210,612 mIU/mL  14 Weeks     13,950 -  62,530 mIU/mL  15 Weeks     12,039 -  70,971 mIU/mL  16 Weeks      9,040 -  56,451 mIU/mL  17 Weeks      8,175 -  55,868 mIU/mL  18 Weeks      8,099 -  58,176 mIU/mL  Results may be falsely decreased if patient taking Biotin.      Lactic Acid, Plasma [[id number]]  (Normal) Collected: 02/14/21 2243    Specimen: Blood Updated: 02/14/21 2325     Lactate 0.7 mmol/L     Blood Culture - Blood, Hand, Right [[id number]] Collected: 02/14/21 2244    Specimen: Blood from Hand, Right Updated: 02/14/21 2305        No results found for: HGBA1C                Microbiology Results (last 10 days)     Procedure Component Value - Date/Time    COVID PRE-OP / PRE-PROCEDURE SCREENING ORDER (NO ISOLATION) - Swab, Nasopharynx [[id number]]  (Normal) Collected: 02/14/21  2039    Lab Status: Final result Specimen: Swab from Nasopharynx Updated: 02/15/21 1422    Narrative:      The following orders were created for panel order COVID PRE-OP / PRE-PROCEDURE SCREENING ORDER (NO ISOLATION) - Swab, Nasopharynx.  Procedure                               Abnormality         Status                     ---------                               -----------         ------                     COVID-19,APTIMA PANTHER,...[[id number]]  Normal              Final result                 Please view results for these tests on the individual orders.    COVID-19,APTIMA PANTHER,[facility] IN-HOUSE, NP/OP SWAB IN UTM/VTM/SALINE TRANSPORT MEDIA,24 HR TAT - Swab, Nasopharynx [[id number]]  (Normal) Collected: 02/14/21 2039    Lab Status: Final result Specimen: Swab from Nasopharynx Updated: 02/15/21 1422     COVID19 Not Detected    Narrative:      Fact sheet for providers: https://www.fda.gov/media/854911/download     Fact sheet for patients: https://www.fda.gov/media/314267/download    Test performed by RT PCR.          ECG/EMG Results (most recent)     None                    Ct Abdomen Pelvis With Contrast    Result Date: 2/14/2021   1. Focal acute diverticulitis of the proximal-mid sigmoid colon. There may be a small localized perforation with no discrete abscess formation. 2. The appendix is normal. 3. Tiny amount of free pelvic cul-de-sac fluid.  Electronically Signed By-Brad Emery MD On:2/14/2021 6:32 PM This report was finalized on [id number] by  Brad Emery MD.      Xrays, labs reviewed personally by physician.    Medication Review:   I have reviewed the patient's current medication list      Scheduled Meds  cefepime, 2 g, Intravenous, Q8H  enoxaparin, 40 mg, Subcutaneous, Daily  metroNIDAZOLE, 500 mg, Intravenous, Q8H  sodium chloride, 10 mL, Intravenous, Q12H        Meds Infusions  Pharmacy to Dose Cefepime,   sodium chloride, 100 mL/hr, Last Rate: 100 mL/hr (02/15/21 2035)        Meds PRN  •   acetaminophen **OR** acetaminophen **OR** acetaminophen  •  bisacodyl  •  Morphine  •  nitroglycerin  •  ondansetron  •  Pharmacy to Dose Cefepime  •  prochlorperazine  •  sodium chloride      Assessment/Plan   Assessment/Plan     Active Hospital Problems    Diagnosis  POA   • **Perforation of sigmoid colon due to diverticulitis [K57.20]  Yes   • Acetaminophen abuse [F55.8]  Yes   • IBS (irritable bowel syndrome) [K58.9]  Yes   • Fatigue [R53.83]  Yes   • Obesity (BMI 30.0-34.9) [E66.9]  Yes   • History of benign colon tumor [Z86.018]  Not Applicable      Resolved Hospital Problems   No resolved problems to display.     Acute diverticulitis/perforation  -CT of the abdomen reveals colonic diverticulosis, acute diverticulitis of the sigmoid colon, and small localized perforation  -Surgery consulted, appreciate recs   -N.p.o. for possible surgical procedure  --Wean dilaudid to morphine   -Cefepime IV and IV Flagyl  -Continuous cardiac monitoring  --Urine preg ordered      IBS/obesity/fatigue  -Encourage lifestyle modifications  -Hold ferrous sulfate for possible surgical procedure in the morning     GUERRA, likely migraine   -Headache cocktail x1   -May re-order if recurs   -UDS order, + for meth, will d/w patient     VTE Prophylaxis - SCDs.      Code Status -   Code Status and Medical Interventions:   Ordered at: 02/14/21 2124     Level Of Support Discussed With:    Patient     Code Status:    CPR     Medical Interventions (Level of Support Prior to Arrest):    Full       Discharge Planning  Pending clinical improvement, finalized surgery recs     Continued Care and Services - Admitted Since 2/14/2021    Coordination has not been started for this encounter.           Electronically signed by Brenda Rahman MD, 02/15/21, 22:27 EST.  [facility] Hospitalist Team

## 2021-02-16 NOTE — PLAN OF CARE
Goal Outcome Evaluation:  Plan of Care Reviewed With: patient, spouse  Progress: improving  Outcome Summary: Patient has slept well throughout the night night. Has complained of some pain; see MAR. Patient did bleed from lovenox shot, 12 hours later. Applied dressing to area. Labs look good. No new concerns at this time. Will continue to monitor.

## 2021-02-16 NOTE — PLAN OF CARE
Goal Outcome Evaluation:        Outcome Summary: Pt to dc home today with spouse. Pts pain treated with PO prn medications. VSS.

## 2021-02-16 NOTE — PROGRESS NOTES
General Surgery Progress Note    Name: Mary Gentile ADMIT: 2021   : 1975  PCP: Sergio Clifford MD    MRN: [id number] LOS: 0 days   AGE/SEX: 45 y.o. female  ROOM: [address] [place] [address]    Chief Complaint   Patient presents with   • Abdominal Pain     Subjective     45 y.o. female admitted with diverticulitis.  She has had some improvement in her pain her last 24 hours.  She did report some bleeding from the Lovenox injection site from her lower abdomen that occurred last night.    Objective         Scheduled Medications:   cefepime, 2 g, Intravenous, Q8H  enoxaparin, 40 mg, Subcutaneous, Daily  metroNIDAZOLE, 500 mg, Intravenous, Q8H  sodium chloride, 10 mL, Intravenous, Q12H        Active Infusions:  Pharmacy to Dose Cefepime,   sodium chloride, 100 mL/hr, Last Rate: 100 mL/hr (21 0822)        As Needed Medications:  •  acetaminophen **OR** acetaminophen **OR** acetaminophen  •  bisacodyl  •  HYDROcodone-acetaminophen  •  Morphine  •  nitroglycerin  •  ondansetron  •  Pharmacy to Dose Cefepime  •  prochlorperazine  •  sodium chloride    Vital Signs  Vital Signs Patient Vitals for the past 24 hrs:   BP Temp Temp src Pulse Resp SpO2 Weight   21 0611 123/75 97.5 °F (36.4 °C) Oral 75 14 94 % 76.7 kg (169 lb 1.5 oz)   02/15/21 2031 105/57 98 °F (36.7 °C) Oral 78 15 99 % --   02/15/21 1717 103/64 98.4 °F (36.9 °C) Oral 85 16 98 % --   02/15/21 1312 122/70 98 °F (36.7 °C) Oral 94 16 98 % --       Physical Exam:  Physical Exam    Results Review:     CBC    Results from last 7 days   Lab Units 02/15/21  0333 21  1700   WBC 10*3/mm3 6.90 12.10*   HEMOGLOBIN g/dL 11.4* 13.0   PLATELETS 10*3/mm3 371 434     CMP   Results from last 7 days   Lab Units 02/15/21  0333 21  2243 21  1700   SODIUM mmol/L 141  --  141   POTASSIUM mmol/L 3.7  --  3.9   CHLORIDE mmol/L 109*  --  106   CO2 mmol/L 22.0  --  24.0   BUN mg/dL 8  --  12   CREATININE mg/dL 0.77  --  0.79   GLUCOSE mg/dL 81  --  88    ALBUMIN g/dL 3.70  --  4.70   BILIRUBIN mg/dL 0.2  --  0.2   ALK PHOS U/L 59  --  74   AST (SGOT) U/L 32  --  50*   ALT (SGPT) U/L 48*  --  67*   AMYLASE U/L  --  50  --    LIPASE U/L  --   --  51       I reviewed the patient's new clinical results.    Assessment/Plan       Perforation of sigmoid colon due to diverticulitis    Fatigue    IBS (irritable bowel syndrome)    Obesity (BMI 30.0-34.9)    History of benign colon tumor    Acetaminophen abuse      45 y.o. female admitted for diverticulitis.  She has had some improvement in her pain and her leukocytosis is resolved.  She can be advanced to full liquids and she can also be discharged and continue oral antibiotics and full liquid diet until her pain is resolved and then she can advance to low residue diet.  I can follow-up in my office in 1 week.      Personal protective equipment used for this patient encounter:  Patient wearing surgical mask []    Provider wearing a surgical mask [x]    Gloves []    Eye protection [x]    Face Shield []    Gown []    N 95 respirator or CAPR/PAPR []     This note was created using Dragon Voice Recognition software.    Natalie Guzman MD  02/16/21  09:14 EST

## 2021-02-17 NOTE — PROGRESS NOTES
Case Management Discharge Note      Final Note: Home         Selected Continued Care - Discharged on 2/16/2021 Admission date: 2/14/2021 - Discharge disposition: Home or Self Care              Final Discharge Disposition Code: 01 - home or self-care

## 2021-02-19 ENCOUNTER — LAB (OUTPATIENT)
Dept: LAB | Facility: HOSPITAL | Age: 46
End: 2021-02-19

## 2021-02-19 ENCOUNTER — HOSPITAL ENCOUNTER (OUTPATIENT)
Dept: CT IMAGING | Facility: HOSPITAL | Age: 46
Discharge: HOME | End: 2021-02-19

## 2021-02-19 ENCOUNTER — OFFICE VISIT (OUTPATIENT)
Dept: BARIATRICS/WEIGHT MGMT | Facility: CLINIC | Age: 46
End: 2021-02-19

## 2021-02-19 VITALS
WEIGHT: 169.6 LBS | HEIGHT: 62 IN | SYSTOLIC BLOOD PRESSURE: 129 MMHG | TEMPERATURE: 98.6 F | BODY MASS INDEX: 31.21 KG/M2 | DIASTOLIC BLOOD PRESSURE: 87 MMHG | RESPIRATION RATE: 16 BRPM | HEART RATE: 76 BPM | OXYGEN SATURATION: 100 %

## 2021-02-19 DIAGNOSIS — K57.92 DIVERTICULITIS: ICD-10-CM

## 2021-02-19 DIAGNOSIS — K57.92 DIVERTICULITIS: Primary | ICD-10-CM

## 2021-02-19 LAB
ACETONE SERPL-MCNC: <.01 G/DL (ref 0–0.01)
ALBUMIN SERPL-MCNC: 4.1 G/DL (ref 3.5–5.2)
ALBUMIN/GLOB SERPL: 1.4 G/DL
ALP SERPL-CCNC: 82 U/L (ref 39–117)
ALT SERPL W P-5'-P-CCNC: 92 U/L (ref 1–33)
ANION GAP SERPL CALCULATED.3IONS-SCNC: 9 MMOL/L (ref 5–15)
AST SERPL-CCNC: 54 U/L (ref 1–32)
BACTERIA SPEC AEROBE CULT: NORMAL
BASOPHILS # BLD AUTO: 0.1 10*3/MM3 (ref 0–0.2)
BASOPHILS NFR BLD AUTO: 1.2 % (ref 0–1.5)
BILIRUB SERPL-MCNC: 0.2 MG/DL (ref 0–1.2)
BILIRUB UR QL STRIP: NEGATIVE
BUN SERPL-MCNC: 6 MG/DL (ref 6–20)
BUN/CREAT SERPL: 8.1 (ref 7–25)
BUTALBITAL SERPL-MCNC: <1 UG/ML (ref 1–10)
CALCIUM SPEC-SCNC: 9.6 MG/DL (ref 8.6–10.5)
CHLORDIAZEP SERPL-MCNC: <0.1 UG/ML (ref 0.1–0.9)
CHLORIDE SERPL-SCNC: 106 MMOL/L (ref 98–107)
CLARITY UR: CLEAR
CO2 SERPL-SCNC: 28 MMOL/L (ref 22–29)
COLOR UR: YELLOW
CREAT BLDA-MCNC: 0.7 MG/DL (ref 0.6–1.3)
CREAT SERPL-MCNC: 0.74 MG/DL (ref 0.57–1)
DEPRECATED RDW RBC AUTO: 41.6 FL (ref 37–54)
DIAZEPAM SERPL-MCNC: <0.1 UG/ML (ref 0.1–0.9)
EOSINOPHIL # BLD AUTO: 0.2 10*3/MM3 (ref 0–0.4)
EOSINOPHIL NFR BLD AUTO: 3.9 % (ref 0.3–6.2)
ERYTHROCYTE [DISTWIDTH] IN BLOOD BY AUTOMATED COUNT: 12.9 % (ref 12.3–15.4)
ETHANOL BLD GC-MCNC: <.01 G/DL (ref 0–0.01)
GFR SERPL CREATININE-BSD FRML MDRD: 84 ML/MIN/1.73
GLOBULIN UR ELPH-MCNC: 2.9 GM/DL
GLUCOSE SERPL-MCNC: 108 MG/DL (ref 65–99)
GLUCOSE UR STRIP-MCNC: NEGATIVE MG/DL
HCT VFR BLD AUTO: 40.5 % (ref 34–46.6)
HGB BLD-MCNC: 13.6 G/DL (ref 12–15.9)
HGB UR QL STRIP.AUTO: NEGATIVE
ISOPROPANOL SERPL-MCNC: <.01 G/DL (ref 0–0.01)
KETONES UR QL STRIP: NEGATIVE
LEUKOCYTE ESTERASE UR QL STRIP.AUTO: NEGATIVE
LYMPHOCYTES # BLD AUTO: 1.9 10*3/MM3 (ref 0.7–3.1)
LYMPHOCYTES NFR BLD AUTO: 30.7 % (ref 19.6–45.3)
Lab: ABNORMAL
MCH RBC QN AUTO: 30.7 PG (ref 26.6–33)
MCHC RBC AUTO-ENTMCNC: 33.5 G/DL (ref 31.5–35.7)
MCV RBC AUTO: 91.7 FL (ref 79–97)
METHANOL SERPL-MCNC: <.01 G/DL (ref 0–0.01)
MONOCYTES # BLD AUTO: 0.6 10*3/MM3 (ref 0.1–0.9)
MONOCYTES NFR BLD AUTO: 10.3 % (ref 5–12)
NEUTROPHILS NFR BLD AUTO: 3.3 10*3/MM3 (ref 1.7–7)
NEUTROPHILS NFR BLD AUTO: 53.9 % (ref 42.7–76)
NITRITE UR QL STRIP: NEGATIVE
NORCHLORDIAZEP SERPL-MCNC: <0.1 UG/ML (ref 0.1–0.6)
NORDIAZEPAM SERPL-MCNC: <0.1 UG/ML (ref 0.1–1.4)
NRBC BLD AUTO-RTO: 0 /100 WBC (ref 0–0.2)
PENTOBARB SERPL-MCNC: <1 UG/ML (ref 1–5)
PH UR STRIP.AUTO: 5.5 [PH] (ref 5–8)
PHENOBARB SERPL-MCNC: <1 UG/ML (ref 15–40)
PLATELET # BLD AUTO: 485 10*3/MM3 (ref 140–450)
PMV BLD AUTO: 7 FL (ref 6–12)
POTASSIUM SERPL-SCNC: 4.4 MMOL/L (ref 3.5–5.2)
PROT SERPL-MCNC: 7 G/DL (ref 6–8.5)
PROT UR QL STRIP: NEGATIVE
RBC # BLD AUTO: 4.42 10*6/MM3 (ref 3.77–5.28)
SALICYLATES SERPL-MCNC: ABNORMAL UG/ML (ref 30–250)
SODIUM SERPL-SCNC: 143 MMOL/L (ref 136–145)
SP GR UR STRIP: 1.03 (ref 1–1.03)
UROBILINOGEN UR QL STRIP: NORMAL
WBC # BLD AUTO: 6.2 10*3/MM3 (ref 3.4–10.8)

## 2021-02-19 PROCEDURE — 80053 COMPREHEN METABOLIC PANEL: CPT

## 2021-02-19 PROCEDURE — 0 IOPAMIDOL PER 1 ML: Performed by: SURGERY

## 2021-02-19 PROCEDURE — 85025 COMPLETE CBC W/AUTO DIFF WBC: CPT

## 2021-02-19 PROCEDURE — 36415 COLL VENOUS BLD VENIPUNCTURE: CPT

## 2021-02-19 PROCEDURE — 82565 ASSAY OF CREATININE: CPT

## 2021-02-19 PROCEDURE — 81003 URINALYSIS AUTO W/O SCOPE: CPT

## 2021-02-19 PROCEDURE — 74177 CT ABD & PELVIS W/CONTRAST: CPT

## 2021-02-19 PROCEDURE — 99214 OFFICE O/P EST MOD 30 MIN: CPT | Performed by: SURGERY

## 2021-02-19 RX ADMIN — IOPAMIDOL 100 ML: 755 INJECTION, SOLUTION INTRAVENOUS at 13:15

## 2021-02-19 NOTE — PROGRESS NOTES
"HISTORY AND PHYSICAL      Patient Care Team:  Sergio Clifford MD as PCP - General (Family Medicine)    Chief complaint LLQ pain    Subjective     Patient is a 45 y.o. female presents with increasing LLQ tenderness and dysuria. She was hospitalized earlier this week for diverticulitis and discharged home.       Review of Systems   Pertinent items are noted in HPI    History  Past Medical History:   Diagnosis Date   • Acetaminophen abuse     Takes 3 Excedrin Migraine daily   • Hypertension    • IBS (irritable bowel syndrome)    • Migraines      Past Surgical History:   Procedure Laterality Date   • ABDOMINAL SURGERY      Patient states that after last  section, she was told that her organs had fused together and the surgeon had to \"rebuild \"her abdomen.   •  SECTION     • CHOLECYSTECTOMY     • HYSTERECTOMY     • TUMOR REMOVAL Left     Fatty tumor left lower abdomen     Family History   Problem Relation Age of Onset   • Cancer Mother      Social History     Tobacco Use   • Smoking status: Former Smoker     Packs/day: 1.00     Years: 20.00     Pack years: 20.00     Quit date: 2017     Years since quittin.0   • Smokeless tobacco: Never Used   Substance Use Topics   • Alcohol use: Not Currently   • Drug use: Never     (Not in a hospital admission)    Allergies:  Dilaudid [hydromorphone hcl], Metformin, and Penicillins    Objective     Vital Signs  Temp:  [98.6 °F (37 °C)] 98.6 °F (37 °C)  Heart Rate:  [76] 76  Resp:  [16] 16  BP: (129)/(87) 129/87    Physical Exam:      General Appearance:    Alert, cooperative, in no acute distress   Head:    Normocephalic, without obvious abnormality, atraumatic   Eyes:            Lids and lashes normal, conjunctivae and sclerae normal, no   icterus, no pallor, corneas clear, PERRLA   Ears:    Ears appear intact with no abnormalities noted   Throat:   No oral lesions, no thrush, oral mucosa moist   Neck:   No adenopathy, supple, trachea midline, no " thyromegaly, no   carotid bruit, no JVD   Back:     No kyphosis present, no scoliosis present, no skin lesions,      erythema or scars, no tenderness to percussion or                   palpation,   range of motion normal   Lungs:     Clear to auscultation,respirations regular, even and                  unlabored    Heart:    Regular rhythm and normal rate, normal S1 and S2, no            murmur, no gallop, no rub, no click   Chest Wall:    No abnormalities observed   Abdomen:     Normal bowel sounds, no masses, no organomegaly, soft        non-tender, non-distended, no guarding, no rebound                tenderness   Rectal:     Deferred   Extremities:   Moves all extremities well, no edema, no cyanosis, no             redness   Pulses:   Pulses palpable and equal bilaterally   Skin:   No bleeding, bruising or rash   Lymph nodes:   No palpable adenopathy   Neurologic:   Cranial nerves 2 - 12 grossly intact, sensation intact, DTR       present and equal bilaterally     Lab Results (last 24 hours)     ** No results found for the last 24 hours. **          Imaging Results (Last 24 Hours)     ** No results found for the last 24 hours. **          Results Review:    I reviewed the patient's new clinical results.  I reviewed the patient's new imaging results and agree with the interpretation.    Assessment/Plan       Diverticulitis, not improving with oral antibiotics and full liquids. Will get STAT ct and labs and UA      Natalie Guzman MD  02/19/21  10:47 EST

## 2022-12-06 ENCOUNTER — APPOINTMENT (OUTPATIENT)
Dept: GENERAL RADIOLOGY | Facility: HOSPITAL | Age: 47
End: 2022-12-06

## 2022-12-06 ENCOUNTER — HOSPITAL ENCOUNTER (EMERGENCY)
Facility: HOSPITAL | Age: 47
Discharge: HOME | End: 2022-12-06
Attending: EMERGENCY MEDICINE | Admitting: EMERGENCY MEDICINE

## 2022-12-06 VITALS
SYSTOLIC BLOOD PRESSURE: 131 MMHG | TEMPERATURE: 98.1 F | HEIGHT: 62 IN | DIASTOLIC BLOOD PRESSURE: 97 MMHG | WEIGHT: 163.14 LBS | BODY MASS INDEX: 30.02 KG/M2 | HEART RATE: 78 BPM | OXYGEN SATURATION: 99 % | RESPIRATION RATE: 18 BRPM

## 2022-12-06 DIAGNOSIS — R07.9 CHEST PAIN, UNSPECIFIED TYPE: Primary | ICD-10-CM

## 2022-12-06 LAB
ANION GAP SERPL CALCULATED.3IONS-SCNC: 12 MMOL/L (ref 5–15)
BASOPHILS # BLD AUTO: 0.1 10*3/MM3 (ref 0–0.2)
BASOPHILS NFR BLD AUTO: 0.9 % (ref 0–1.5)
BUN SERPL-MCNC: 18 MG/DL (ref 6–20)
BUN/CREAT SERPL: 25 (ref 7–25)
CALCIUM SPEC-SCNC: 9.6 MG/DL (ref 8.6–10.5)
CHLORIDE SERPL-SCNC: 102 MMOL/L (ref 98–107)
CO2 SERPL-SCNC: 26 MMOL/L (ref 22–29)
CREAT SERPL-MCNC: 0.72 MG/DL (ref 0.57–1)
DEPRECATED RDW RBC AUTO: 44.2 FL (ref 37–54)
EGFRCR SERPLBLD CKD-EPI 2021: 103.3 ML/MIN/1.73
EOSINOPHIL # BLD AUTO: 0.3 10*3/MM3 (ref 0–0.4)
EOSINOPHIL NFR BLD AUTO: 3.4 % (ref 0.3–6.2)
ERYTHROCYTE [DISTWIDTH] IN BLOOD BY AUTOMATED COUNT: 12.9 % (ref 12.3–15.4)
GLUCOSE SERPL-MCNC: 88 MG/DL (ref 65–99)
HCT VFR BLD AUTO: 40.6 % (ref 34–46.6)
HGB BLD-MCNC: 13.6 G/DL (ref 12–15.9)
HOLD SPECIMEN: NORMAL
HOLD SPECIMEN: NORMAL
LIPASE SERPL-CCNC: 32 U/L (ref 13–60)
LYMPHOCYTES # BLD AUTO: 2 10*3/MM3 (ref 0.7–3.1)
LYMPHOCYTES NFR BLD AUTO: 26.9 % (ref 19.6–45.3)
MCH RBC QN AUTO: 31.1 PG (ref 26.6–33)
MCHC RBC AUTO-ENTMCNC: 33.6 G/DL (ref 31.5–35.7)
MCV RBC AUTO: 92.7 FL (ref 79–97)
MONOCYTES # BLD AUTO: 0.7 10*3/MM3 (ref 0.1–0.9)
MONOCYTES NFR BLD AUTO: 9.7 % (ref 5–12)
NEUTROPHILS NFR BLD AUTO: 4.4 10*3/MM3 (ref 1.7–7)
NEUTROPHILS NFR BLD AUTO: 59.1 % (ref 42.7–76)
NRBC BLD AUTO-RTO: 0.1 /100 WBC (ref 0–0.2)
PLATELET # BLD AUTO: 458 10*3/MM3 (ref 140–450)
PMV BLD AUTO: 7.2 FL (ref 6–12)
POTASSIUM SERPL-SCNC: 4 MMOL/L (ref 3.5–5.2)
RBC # BLD AUTO: 4.38 10*6/MM3 (ref 3.77–5.28)
SODIUM SERPL-SCNC: 140 MMOL/L (ref 136–145)
TROPONIN T SERPL-MCNC: <0.01 NG/ML (ref 0–0.03)
WBC NRBC COR # BLD: 7.5 10*3/MM3 (ref 3.4–10.8)
WHOLE BLOOD HOLD COAG: NORMAL

## 2022-12-06 PROCEDURE — 85025 COMPLETE CBC W/AUTO DIFF WBC: CPT | Performed by: EMERGENCY MEDICINE

## 2022-12-06 PROCEDURE — 84484 ASSAY OF TROPONIN QUANT: CPT | Performed by: EMERGENCY MEDICINE

## 2022-12-06 PROCEDURE — 83690 ASSAY OF LIPASE: CPT | Performed by: EMERGENCY MEDICINE

## 2022-12-06 PROCEDURE — 71045 X-RAY EXAM CHEST 1 VIEW: CPT

## 2022-12-06 PROCEDURE — 93005 ELECTROCARDIOGRAM TRACING: CPT

## 2022-12-06 PROCEDURE — 99283 EMERGENCY DEPT VISIT LOW MDM: CPT

## 2022-12-06 PROCEDURE — 99284 EMERGENCY DEPT VISIT MOD MDM: CPT

## 2022-12-06 PROCEDURE — 80048 BASIC METABOLIC PNL TOTAL CA: CPT | Performed by: EMERGENCY MEDICINE

## 2022-12-06 RX ORDER — SODIUM CHLORIDE 0.9 % (FLUSH) 0.9 %
10 SYRINGE (ML) INJECTION AS NEEDED
Status: DISCONTINUED | OUTPATIENT
Start: 2022-12-06 | End: 2022-12-06 | Stop reason: HOSPADM

## 2022-12-06 RX ORDER — PANTOPRAZOLE SODIUM 40 MG/1
40 TABLET, DELAYED RELEASE ORAL DAILY
Qty: 14 TABLET | Refills: 0 | Status: SHIPPED | OUTPATIENT
Start: 2022-12-06 | End: 2023-01-31

## 2022-12-06 NOTE — ED NOTES
Pt c/o chest pain since yesterday radiating up into her right shoulder, shortness of breath, states she had extreme heartburn yesterday also reports dizziness

## 2022-12-06 NOTE — ED PROVIDER NOTES
"Subjective   History of Present Illness  Patient is a 47-year-old female complaining of sharp pain in her chest radiating to her right arm.  States the pain is mild lasting minutes at a time.  Denies cough fever or other complaint.        Review of Systems   Constitutional: Negative for chills and fever.   HENT: Negative for congestion and sore throat.    Eyes: Negative for visual disturbance.   Respiratory: Negative for cough and shortness of breath.    Cardiovascular: Negative for chest pain.   Gastrointestinal: Negative for abdominal pain, diarrhea and vomiting.   Endocrine: Negative for polyuria.   Genitourinary: Negative for dysuria and flank pain.   Musculoskeletal: Negative for back pain.   Neurological: Negative for dizziness and headaches.       Past Medical History:   Diagnosis Date   • Acetaminophen abuse     Takes 3 Excedrin Migraine daily   • Hypertension    • IBS (irritable bowel syndrome)    • Migraines        Allergies   Allergen Reactions   • Dilaudid [Hydromorphone Hcl] Headache   • Metformin Diarrhea   • Penicillins Unknown - High Severity     Tolerated cefepime       Past Surgical History:   Procedure Laterality Date   • ABDOMINAL SURGERY      Patient states that after last  section, she was told that her organs had fused together and the surgeon had to \"rebuild \"her abdomen.   •  SECTION     • CHOLECYSTECTOMY     • HYSTERECTOMY     • TUMOR REMOVAL Left     Fatty tumor left lower abdomen       Family History   Problem Relation Age of Onset   • Cancer Mother        Social History     Socioeconomic History   • Marital status:    Tobacco Use   • Smoking status: Former     Packs/day: 1.00     Years: 20.00     Pack years: 20.00     Types: Cigarettes     Quit date: 2017     Years since quittin.8   • Smokeless tobacco: Never   Substance and Sexual Activity   • Alcohol use: Not Currently   • Drug use: Never   • Sexual activity: Defer           Objective   Physical " Exam  HEENT exam shows TMs to be clear.  Oropharynx comers but sclera is nonicteric.  Neck has no adenopathy JVD or bruits.  Lungs are clear.  Heart has a regular rate rhythm without murmur rub or gallop.  Chest is nontender.  Abdomen is soft nontender.  Patient with normal bowel sounds without rebound or guarding.  Back has no CVA tenderness.  Extremity exam unremarkable  Procedures     My EKG interpretation shows normal sinus rhythm at a rate of 85 with no acute ST change      ED Course      Results for orders placed or performed during the hospital encounter of 12/06/22   Basic Metabolic Panel    Specimen: Blood   Result Value Ref Range    Glucose 88 65 - 99 mg/dL    BUN 18 6 - 20 mg/dL    Creatinine 0.72 0.57 - 1.00 mg/dL    Sodium 140 136 - 145 mmol/L    Potassium 4.0 3.5 - 5.2 mmol/L    Chloride 102 98 - 107 mmol/L    CO2 26.0 22.0 - 29.0 mmol/L    Calcium 9.6 8.6 - 10.5 mg/dL    BUN/Creatinine Ratio 25.0 7.0 - 25.0    Anion Gap 12.0 5.0 - 15.0 mmol/L    eGFR 103.3 >60.0 mL/min/1.73   Lipase    Specimen: Blood   Result Value Ref Range    Lipase 32 13 - 60 U/L   Troponin    Specimen: Blood   Result Value Ref Range    Troponin T <0.010 0.000 - 0.030 ng/mL   CBC Auto Differential    Specimen: Blood   Result Value Ref Range    WBC 7.50 3.40 - 10.80 10*3/mm3    RBC 4.38 3.77 - 5.28 10*6/mm3    Hemoglobin 13.6 12.0 - 15.9 g/dL    Hematocrit 40.6 34.0 - 46.6 %    MCV 92.7 79.0 - 97.0 fL    MCH 31.1 26.6 - 33.0 pg    MCHC 33.6 31.5 - 35.7 g/dL    RDW 12.9 12.3 - 15.4 %    RDW-SD 44.2 37.0 - 54.0 fl    MPV 7.2 6.0 - 12.0 fL    Platelets 458 (H) 140 - 450 10*3/mm3    Neutrophil % 59.1 42.7 - 76.0 %    Lymphocyte % 26.9 19.6 - 45.3 %    Monocyte % 9.7 5.0 - 12.0 %    Eosinophil % 3.4 0.3 - 6.2 %    Basophil % 0.9 0.0 - 1.5 %    Neutrophils, Absolute 4.40 1.70 - 7.00 10*3/mm3    Lymphocytes, Absolute 2.00 0.70 - 3.10 10*3/mm3    Monocytes, Absolute 0.70 0.10 - 0.90 10*3/mm3    Eosinophils, Absolute 0.30 0.00 - 0.40  10*3/mm3    Basophils, Absolute 0.10 0.00 - 0.20 10*3/mm3    nRBC 0.1 0.0 - 0.2 /100 WBC   ECG 12 Lead Dyspnea   Result Value Ref Range    QT Interval 384 ms   Green Top (Gel)   Result Value Ref Range    Extra Tube HOLD    Gold Top - SST   Result Value Ref Range    Extra Tube Hold for add-ons.    Light Blue Top   Result Value Ref Range    Extra Tube Hold for add-ons.      XR Chest 1 View    Result Date: 12/6/2022  No acute process.  Electronically Signed By-Amauri Cameron MD On:12/6/2022 2:48 PM This report was finalized on [id number] by  Amauri Cameron MD.                                         MDM  Number of Diagnoses or Management Options  Diagnosis management comments: Patient has no evidence of acute coronary syndrome based on EKG and troponin.  Metabolic panel has no renal insufficiency or electrolyte abnormality there is no acute infectious process.  Patient was pain-free on reexamination.  She will be discharged patient will be placed on Protonix.  She will see MD for recheck       Amount and/or Complexity of Data Reviewed  Clinical lab tests: reviewed  Tests in the radiology section of CPT®: reviewed  Tests in the medicine section of CPT®: reviewed    Risk of Complications, Morbidity, and/or Mortality  Presenting problems: high  Diagnostic procedures: high  Management options: high    Patient Progress  Patient progress: stable      Final diagnoses:   Chest pain, unspecified type       ED Disposition  ED Disposition     ED Disposition   Discharge    Condition   Stable    Comment   --             No follow-up provider specified.       Medication List      New Prescriptions    pantoprazole 40 MG EC tablet  Commonly known as: PROTONIX  Take 1 tablet by mouth Daily.           Where to Get Your Medications      These medications were sent to [id number] - [place], [place] - [address] - [phone]  - [phone]   [address] [place] [address]    Phone: [phone]    · pantoprazole 40 MG EC tablet          Cipriano Jones MD  12/06/22 9224     Never smoker

## 2022-12-13 LAB — QT INTERVAL: 384 MS

## 2022-12-14 ENCOUNTER — TELEPHONE (OUTPATIENT)
Dept: CARDIOLOGY | Facility: CLINIC | Age: 47
End: 2022-12-14

## 2022-12-14 NOTE — TELEPHONE ENCOUNTER
Caller: Mary Gentile    Relationship: Self    Best call back number: [phone]    What is the best time to reach you: ANYTIME     Who are you requesting to speak with (clinical staff, provider,  specific staff member): ANYONE     What was the call regarding: PATIENT WAS IN THE ER ON 12.6.22 AND IS REQUESTING SOONER APPT. WAS TOLD HER EKGS SHOWED ABNORMALITIES. SAYS SHES BEEN GETTING RANDOM CHEST TIGHTNESS AND WORRIED HER SYMPTOMS HAVE BEEN PROGRESSIVELY WORSENING. UNABLE TO WARM TRANSFER.     Do you require a callback: YES

## 2022-12-21 ENCOUNTER — OFFICE VISIT (OUTPATIENT)
Dept: CARDIOLOGY | Facility: CLINIC | Age: 47
End: 2022-12-21

## 2022-12-21 ENCOUNTER — HOSPITAL ENCOUNTER (OUTPATIENT)
Dept: CT IMAGING | Facility: HOSPITAL | Age: 47
Discharge: HOME | End: 2022-12-21
Admitting: INTERNAL MEDICINE

## 2022-12-21 VITALS
DIASTOLIC BLOOD PRESSURE: 85 MMHG | BODY MASS INDEX: 31.65 KG/M2 | WEIGHT: 172 LBS | HEART RATE: 101 BPM | HEIGHT: 62 IN | SYSTOLIC BLOOD PRESSURE: 137 MMHG | OXYGEN SATURATION: 100 %

## 2022-12-21 DIAGNOSIS — I25.9 CHEST PAIN DUE TO MYOCARDIAL ISCHEMIA, UNSPECIFIED ISCHEMIC CHEST PAIN TYPE: ICD-10-CM

## 2022-12-21 DIAGNOSIS — D75.839 THROMBOCYTOSIS: ICD-10-CM

## 2022-12-21 DIAGNOSIS — R00.2 PALPITATIONS: ICD-10-CM

## 2022-12-21 DIAGNOSIS — R06.09 DYSPNEA ON EXERTION: ICD-10-CM

## 2022-12-21 DIAGNOSIS — R94.31 ABNORMAL ECG: ICD-10-CM

## 2022-12-21 DIAGNOSIS — R53.83 OTHER FATIGUE: Primary | ICD-10-CM

## 2022-12-21 PROBLEM — I50.9 CONGESTIVE HEART FAILURE: Status: ACTIVE | Noted: 2022-12-20

## 2022-12-21 PROBLEM — I10 BENIGN ESSENTIAL HYPERTENSION: Status: ACTIVE | Noted: 2022-11-18

## 2022-12-21 PROCEDURE — 71275 CT ANGIOGRAPHY CHEST: CPT

## 2022-12-21 PROCEDURE — 0 IOPAMIDOL PER 1 ML: Performed by: INTERNAL MEDICINE

## 2022-12-21 PROCEDURE — 99204 OFFICE O/P NEW MOD 45 MIN: CPT | Performed by: INTERNAL MEDICINE

## 2022-12-21 PROCEDURE — 93000 ELECTROCARDIOGRAM COMPLETE: CPT | Performed by: INTERNAL MEDICINE

## 2022-12-21 RX ORDER — ALPRAZOLAM 0.25 MG/1
1 TABLET ORAL EVERY 12 HOURS SCHEDULED
COMMUNITY
End: 2023-01-31

## 2022-12-21 RX ORDER — ESTRADIOL 0.07 MG/D
1 FILM, EXTENDED RELEASE TRANSDERMAL 2 TIMES WEEKLY
COMMUNITY

## 2022-12-21 RX ORDER — IPRATROPIUM BROMIDE AND ALBUTEROL SULFATE 2.5; .5 MG/3ML; MG/3ML
SOLUTION RESPIRATORY (INHALATION)
COMMUNITY

## 2022-12-21 RX ORDER — TERBINAFINE HYDROCHLORIDE 250 MG/1
1 TABLET ORAL DAILY
COMMUNITY

## 2022-12-21 RX ORDER — MELOXICAM 15 MG/1
1 TABLET ORAL DAILY
COMMUNITY
Start: 2022-12-17 | End: 2023-01-31

## 2022-12-21 RX ORDER — BUPROPION HYDROCHLORIDE 150 MG/1
1 TABLET ORAL DAILY
COMMUNITY

## 2022-12-21 RX ORDER — POTASSIUM CHLORIDE 750 MG/1
1 TABLET, FILM COATED, EXTENDED RELEASE ORAL DAILY
COMMUNITY
Start: 2022-12-20

## 2022-12-21 RX ORDER — FUROSEMIDE 20 MG/1
1 TABLET ORAL DAILY
COMMUNITY
End: 2023-01-31 | Stop reason: DRUGHIGH

## 2022-12-21 RX ORDER — ALBUTEROL SULFATE 90 UG/1
2 AEROSOL, METERED RESPIRATORY (INHALATION) EVERY 4 HOURS PRN
COMMUNITY
Start: 2022-12-21

## 2022-12-21 RX ADMIN — IOPAMIDOL 100 ML: 755 INJECTION, SOLUTION INTRAVENOUS at 16:10

## 2023-01-03 ENCOUNTER — HOSPITAL ENCOUNTER (OUTPATIENT)
Dept: CARDIOLOGY | Facility: HOSPITAL | Age: 48
Discharge: HOME | End: 2023-01-03
Admitting: INTERNAL MEDICINE
Payer: COMMERCIAL

## 2023-01-03 PROCEDURE — 93306 TTE W/DOPPLER COMPLETE: CPT | Performed by: INTERNAL MEDICINE

## 2023-01-03 PROCEDURE — 93306 TTE W/DOPPLER COMPLETE: CPT

## 2023-01-06 ENCOUNTER — TELEPHONE (OUTPATIENT)
Dept: CARDIOLOGY | Facility: CLINIC | Age: 48
End: 2023-01-06
Payer: COMMERCIAL

## 2023-01-06 LAB
BH CV ECHO MEAS - ACS: 1.92 CM
BH CV ECHO MEAS - AO MAX PG: 5.1 MMHG
BH CV ECHO MEAS - AO MEAN PG: 2.6 MMHG
BH CV ECHO MEAS - AO ROOT DIAM: 3 CM
BH CV ECHO MEAS - AO V2 MAX: 113 CM/SEC
BH CV ECHO MEAS - AO V2 VTI: 23.4 CM
BH CV ECHO MEAS - AVA(I,D): 1.47 CM2
BH CV ECHO MEAS - EDV(CUBED): 119.7 ML
BH CV ECHO MEAS - EDV(MOD-SP4): 91.9 ML
BH CV ECHO MEAS - EF(MOD-BP): 54 %
BH CV ECHO MEAS - EF(MOD-SP4): 53.8 %
BH CV ECHO MEAS - ESV(CUBED): 61.2 ML
BH CV ECHO MEAS - ESV(MOD-SP4): 42.4 ML
BH CV ECHO MEAS - FS: 20.1 %
BH CV ECHO MEAS - IVS/LVPW: 0.8 CM
BH CV ECHO MEAS - IVSD: 0.74 CM
BH CV ECHO MEAS - LA DIMENSION: 3.5 CM
BH CV ECHO MEAS - LV DIASTOLIC VOL/BSA (35-75): 52.1 CM2
BH CV ECHO MEAS - LV MASS(C)D: 138.9 GRAMS
BH CV ECHO MEAS - LV MAX PG: 1.56 MMHG
BH CV ECHO MEAS - LV MEAN PG: 0.76 MMHG
BH CV ECHO MEAS - LV SYSTOLIC VOL/BSA (12-30): 24.1 CM2
BH CV ECHO MEAS - LV V1 MAX: 62.4 CM/SEC
BH CV ECHO MEAS - LV V1 VTI: 13 CM
BH CV ECHO MEAS - LVIDD: 4.9 CM
BH CV ECHO MEAS - LVIDS: 3.9 CM
BH CV ECHO MEAS - LVOT AREA: 2.7 CM2
BH CV ECHO MEAS - LVOT DIAM: 1.84 CM
BH CV ECHO MEAS - LVPWD: 0.92 CM
BH CV ECHO MEAS - MR MAX PG: 85.3 MMHG
BH CV ECHO MEAS - MR MAX VEL: 461.5 CM/SEC
BH CV ECHO MEAS - MV A MAX VEL: 69.1 CM/SEC
BH CV ECHO MEAS - MV DEC SLOPE: 590.6 CM/SEC2
BH CV ECHO MEAS - MV DEC TIME: 0.13 MSEC
BH CV ECHO MEAS - MV E MAX VEL: 77.7 CM/SEC
BH CV ECHO MEAS - MV E/A: 1.12
BH CV ECHO MEAS - MV MAX PG: 5.9 MMHG
BH CV ECHO MEAS - MV MEAN PG: 2.9 MMHG
BH CV ECHO MEAS - MV V2 VTI: 22.2 CM
BH CV ECHO MEAS - MVA(VTI): 1.56 CM2
BH CV ECHO MEAS - PA ACC TIME: 0.16 SEC
BH CV ECHO MEAS - PA PR(ACCEL): 5.8 MMHG
BH CV ECHO MEAS - PULM A REVS DUR: 0.09 SEC
BH CV ECHO MEAS - PULM A REVS VEL: 33.5 CM/SEC
BH CV ECHO MEAS - PULM DIAS VEL: 43 CM/SEC
BH CV ECHO MEAS - PULM S/D: 1.35
BH CV ECHO MEAS - PULM SYS VEL: 57.8 CM/SEC
BH CV ECHO MEAS - RAP SYSTOLE: 3 MMHG
BH CV ECHO MEAS - RV MAX PG: 1.4 MMHG
BH CV ECHO MEAS - RV V1 MAX: 59.2 CM/SEC
BH CV ECHO MEAS - RV V1 VTI: 12.9 CM
BH CV ECHO MEAS - RVDD: 2.8 CM
BH CV ECHO MEAS - RVSP: 30.7 MMHG
BH CV ECHO MEAS - SI(MOD-SP4): 28.1 ML/M2
BH CV ECHO MEAS - SV(LVOT): 34.6 ML
BH CV ECHO MEAS - SV(MOD-SP4): 49.5 ML
BH CV ECHO MEAS - TR MAX PG: 27.7 MMHG
BH CV ECHO MEAS - TR MAX VEL: 263 CM/SEC
MAXIMAL PREDICTED HEART RATE: 172 BPM
STRESS TARGET HR: 146 BPM

## 2023-01-06 NOTE — TELEPHONE ENCOUNTER
Adult Transthoracic Echo Complete W/ Cont if Necessary Per Protocol (01/03/2023 13:11)  Please call with results

## 2023-01-17 ENCOUNTER — TELEPHONE (OUTPATIENT)
Dept: CARDIOLOGY | Facility: CLINIC | Age: 48
End: 2023-01-17
Payer: MEDICAID

## 2023-01-17 NOTE — TELEPHONE ENCOUNTER
Caller: DMITRY - [facility]    Relationship: INSURANCE     Best call back number: CALL PATIENT [phone]    What was the call regarding: DMITRY CALLED W/ QUESTIONS REGARDING THE PATIENTS HOLTER MONITOR - REPORTED THAT THE PATIENT DIDN'T KNOW HOW LONG SHE WAS SUPPOSED TO WEAR IT AND DID NOT RECEIVE ANY INFORMATION ABOUT HOW TO RETURN IT - SHE WOULD LIKE THE OFFICE TO GIVE THE PATIENT A CALL BACK WITH INSTRUCTIONS    SENDING AS HIGH PRIORITY PER REQUEST    Do you require a callback: YES

## 2023-01-30 NOTE — PROGRESS NOTES
Encounter Date:01/31/2023      Patient ID: Mary Gentile is a 47 y.o. female.    Chief Complaint:      History of Present Illness  47 years old very pleasant and delightful woman who initially presented to the cardiology office with complaints of sharp pain in her chest radiating to her right arm.  Of note she was in the emergency room with similar complaints.  And ECG showed sinus rhythm with T wave inversions in the inferior and lateral leads, troponin was negative.  D-dimer was not checked.  CBC showed thrombocytosis, BMP was unremarkable.  She also complains of excessive fatigue and dyspnea on exertion.  Of note her BMI is 30.  Today she comes in with complaints of shortness of breath and is visibly dyspneic and tachypneic.  Her symptoms started 3 weeks ago and prior to that she was essentially normal with no symptoms.  She also went to [facility] where she had elevated proBNP and was given diuretics.  She has not had an echocardiogram or a CT to rule out pulmonary embolism.  She also complains of palpitations however her major concern is inability to breathe well without chest pain.  Patient was monitored for 11 days and 13 hours. Primary rhythm was sinus, average heart rate 98 bpm, minimum heart rate 66 and maximum heart rate 171. No evidence of atrial fibrillation/flutter or advanced heart block. 0.1% PVCs and 0.04% PACs. Short runs of SVT up to 34 beats, fastest event 183 bpm.    Today she comes in accompanied by her son.  Her symptoms have improved.  She has noted several episodes of tachycardia and hypertension at home.  She has not resumed work yet.  She still suffers from significant anxiety which appear to be driving her symptoms.          The following portions of the patient's history were reviewed and updated as appropriate: allergies, current medications, past family history, past medical history, past social history, past surgical history and problem list.    Review of Systems    Constitutional: Positive for malaise/fatigue.   Cardiovascular: Positive for chest pain, dyspnea on exertion, leg swelling and palpitations.   Respiratory: Positive for shortness of breath. Negative for cough.    Gastrointestinal: Negative for abdominal pain, nausea and vomiting.   Neurological: Positive for dizziness and headaches. Negative for focal weakness, light-headedness and numbness.   All other systems reviewed and are negative.        Current Outpatient Medications:   •  albuterol sulfate  (90 Base) MCG/ACT inhaler, Inhale 2 puffs Every 4 (Four) Hours As Needed., Disp: , Rfl:   •  buPROPion XL (WELLBUTRIN XL) 150 MG 24 hr tablet, Take 1 tablet by mouth Daily., Disp: , Rfl:   •  butalbital-acetaminophen-caffeine (FIORICET, ESGIC) -40 MG per tablet, As Needed., Disp: , Rfl:   •  clotrimazole (LOTRIMIN) 1 % cream, clotrimazole 1 % topical cream, Disp: , Rfl:   •  estradiol (MINIVELLE, VIVELLE-DOT) 0.075 MG/24HR patch, Place 1 patch on the skin as directed by provider 2 (Two) Times a Week., Disp: , Rfl:   •  furosemide (LASIX) 40 MG tablet, Daily., Disp: , Rfl:   •  Galcanezumab-gnlm (Emgality, 300 MG Dose,) 100 MG/ML solution prefilled syringe, Emgality 300 mg/3 mL (100 mg/mL x 3) subcutaneous syringe  Inject 3 mL every month by subcutaneous route., Disp: , Rfl:   •  ipratropium-albuterol (DUO-NEB) 0.5-2.5 mg/3 ml nebulizer, ipratropium 0.5 mg-albuterol 3 mg (2.5 mg base)/3 mL nebulization soln  INHALE 3 MLS (1 VIAL) VIA NEBULIZER FOUR TIMES A DAY, Disp: , Rfl:   •  nystatin (MYCOSTATIN) 100,000 unit/mL suspension, As Needed., Disp: , Rfl:   •  potassium chloride 10 MEQ CR tablet, Take 1 tablet by mouth Daily., Disp: , Rfl:   •  terbinafine (lamiSIL) 250 MG tablet, Take 1 tablet by mouth Daily., Disp: , Rfl:   •  vitamin D3 125 MCG (5000 UT) capsule capsule, cholecalciferol (vitamin D3) 125 mcg (5,000 unit) capsule  TAKE 1 CAPSULE BY MOUTH EVERY DAY, Disp: , Rfl:   •  carvedilol (COREG) 6.25 MG  "tablet, Take 1 tablet by mouth 2 (Two) Times a Day., Disp: 180 tablet, Rfl: 3    Current outpatient and discharge medications have been reconciled for the patient.  Reviewed by: Ronn Matthews MD       Allergies   Allergen Reactions   • Penicillins Unknown - High Severity and Swelling     Tolerated cefepime   • Dilaudid [Hydromorphone Hcl] Headache       Family History   Problem Relation Age of Onset   • Cancer Mother    • Anemia Mother    • Arrhythmia Mother    • Clotting disorder Mother    • Fainting Mother    • Heart attack Mother    • Heart disease Mother    • Heart failure Mother    • Heart disease Maternal Grandfather         Harley had heart disease and a stroke   • Heart attack Maternal Grandfather    • Heart attack Brother    • Heart disease Brother    • Heart failure Brother    • Hypertension Brother        Past Surgical History:   Procedure Laterality Date   • ABDOMINAL SURGERY      Patient states that after last  section, she was told that her organs had fused together and the surgeon had to \"rebuild \"her abdomen.   •  SECTION     • CHOLECYSTECTOMY     • HYSTERECTOMY     • TUMOR REMOVAL Left     Fatty tumor left lower abdomen       Past Medical History:   Diagnosis Date   • Abnormal ECG 2022   • Acetaminophen abuse     Takes 3 Excedrin Migraine daily   • Congenital heart disease 2022   • Congestive heart failure (HCC) 2022   • Heart murmur     It was obly noticed when i was pregnant   • Hypertension    • IBS (irritable bowel syndrome)    • Migraines        Family History   Problem Relation Age of Onset   • Cancer Mother    • Anemia Mother    • Arrhythmia Mother    • Clotting disorder Mother    • Fainting Mother    • Heart attack Mother    • Heart disease Mother    • Heart failure Mother    • Heart disease Maternal Grandfather         Harley had heart disease and a stroke   • Heart attack Maternal Grandfather    • Heart attack Brother    • Heart disease Brother    • " "Heart failure Brother    • Hypertension Brother        Social History     Socioeconomic History   • Marital status:    Tobacco Use   • Smoking status: Some Days     Packs/day: 0.50     Years: 22.00     Pack years: 11.00     Types: Cigarettes, Electronic Cigarette     Start date: 10/10/1995     Last attempt to quit: 8/15/2017     Years since quittin.4   • Smokeless tobacco: Never   • Tobacco comments:     Stopped smoking cigarettes. Picked up vaping shortly after   Vaping Use   • Vaping Use: Some days   • Substances: Nicotine   • Devices: Refillable tank   Substance and Sexual Activity   • Alcohol use: Not Currently     Comment: Occasionally. Holidays or nights out with family or friends   • Drug use: Never   • Sexual activity: Yes     Partners: Male     Birth control/protection: Hysterectomy         Procedures      Objective:       Physical Exam    /68 (BP Location: Left arm, Patient Position: Sitting)   Pulse 92   Ht 156.2 cm (61.5\")   Wt 80.7 kg (178 lb)   SpO2 99%   BMI 33.09 kg/m²   The patient is alert, oriented and in no distress.    Vital signs as noted above.    Head and neck revealed no carotid bruits or jugular venous distension.  No thyromegaly or lymphadenopathy is present.    Lungs clear.  No wheezing.  Breath sounds are normal bilaterally.    Heart normal first and second heart sounds.  No murmur..  No pericardial rub is present.  No gallop is present.    Abdomen soft and nontender.  No organomegaly is present.    Extremities revealed good peripheral pulses without any pedal edema.    Skin warm and dry.    Musculoskeletal system is grossly normal.    CNS grossly normal.           Diagnosis Plan   1. Other fatigue        2. Chest pain due to myocardial ischemia, unspecified ischemic chest pain type        3. Abnormal ECG        4. Thrombocytosis        5. Dyspnea on exertion  carvedilol (COREG) 6.25 MG tablet      6. Palpitations  carvedilol (COREG) 6.25 MG tablet      7. Benign " essential hypertension  carvedilol (COREG) 6.25 MG tablet      LAB RESULTS (LAST 7 DAYS)    CBC        BMP        CMP         BNP        TROPONIN        CoAg        Creatinine Clearance  CrCl cannot be calculated (Patient's most recent lab result is older than the maximum 30 days allowed.).    ABG        Radiology  No radiology results for the last day      Echocardiogram from January 2023 shows EF of 54%, no significant valvular abnormalities.  Holter monitor for 11 days showed sinus rhythm with average heart rate of 98 bpm, minimum/maximum heart rate of 66/171 bpm.  Short runs of SVT up to 34 beats with fastest being 183 bpm.  0.1% PVCs and 0.04% PACs  Assessment and Plan       Diagnoses and all orders for this visit:    1. Other fatigue (Primary)    2. Chest pain due to myocardial ischemia, unspecified ischemic chest pain type    3. Abnormal ECG    4. Thrombocytosis    5. Dyspnea on exertion  -     carvedilol (COREG) 6.25 MG tablet; Take 1 tablet by mouth 2 (Two) Times a Day.  Dispense: 180 tablet; Refill: 3    6. Palpitations  -     carvedilol (COREG) 6.25 MG tablet; Take 1 tablet by mouth 2 (Two) Times a Day.  Dispense: 180 tablet; Refill: 3    7. Benign essential hypertension  -     carvedilol (COREG) 6.25 MG tablet; Take 1 tablet by mouth 2 (Two) Times a Day.  Dispense: 180 tablet; Refill: 3       1.  Hypertension  I believe this is anxiety driven.  I will start her on Coreg 6.25 mg p.o. twice daily for hypertension and tachycardia.  I will uptitrate these based on her blood pressure and heart rate log that she will provide to us.  I have encouraged her to focus on management of anxiety which appears to be driving the hypertension and tachycardia episodes.  2. Chest pain  Appears pleuritic she could also have pericarditis.  She complains of a variety of symptoms of atypical chest pain, leg swelling, shortness of breath.  CT scan has ruled out pulmonary embolism  Echocardiogram shows preserved LV function with  EF of 54%, no significant valvular abnormalities.  Her symptoms have now resolved  3. Abnormal ECG  ECG shows T wave inversions in the inferior leads and ST T wave changes in the lateral leads.  She also has significant cardiac history in her grandmother, brother.  Echocardiogram shows normal LV function and CT scan ruled out pulmonary embolism.  She currently denies any chest pain or shortness of breath.  4. Thrombocytosis  Most recent labs showed mild thrombocytosis with platelets 458K.  We will continue to monitor and subsequent labs.  5. Dyspnea on exertion  CT scan has ruled out pulmonary embolism.  Echocardiogram shows preserved LV function with no significant valvular abnormalities  She has been started on furosemide and potassium supplementation by [facility].  She has felt minimal improvement in her symptoms from albuterol inhaler.  6. Palpitations  Holter monitor for 11 days showed sinus rhythm with average heart rate of 98 bpm, minimum/maximum heart rate of 66/171 bpm.  Short runs of SVT up to 34 beats with fastest being 183 bpm.  0.1% PVCs and 0.04% PACs  ECG does not show any evidence of arrhythmia.  I am starting beta-blocker today with the hope of improving palpitations and average heart rate.

## 2023-01-31 ENCOUNTER — OFFICE VISIT (OUTPATIENT)
Dept: CARDIOLOGY | Facility: CLINIC | Age: 48
End: 2023-01-31
Payer: COMMERCIAL

## 2023-01-31 VITALS
HEART RATE: 92 BPM | OXYGEN SATURATION: 99 % | SYSTOLIC BLOOD PRESSURE: 147 MMHG | DIASTOLIC BLOOD PRESSURE: 68 MMHG | BODY MASS INDEX: 32.76 KG/M2 | WEIGHT: 178 LBS | HEIGHT: 62 IN

## 2023-01-31 DIAGNOSIS — R94.31 ABNORMAL ECG: ICD-10-CM

## 2023-01-31 DIAGNOSIS — R06.09 DYSPNEA ON EXERTION: ICD-10-CM

## 2023-01-31 DIAGNOSIS — R00.2 PALPITATIONS: ICD-10-CM

## 2023-01-31 DIAGNOSIS — R53.83 OTHER FATIGUE: Primary | ICD-10-CM

## 2023-01-31 DIAGNOSIS — D75.839 THROMBOCYTOSIS: ICD-10-CM

## 2023-01-31 DIAGNOSIS — I10 BENIGN ESSENTIAL HYPERTENSION: ICD-10-CM

## 2023-01-31 DIAGNOSIS — I25.9 CHEST PAIN DUE TO MYOCARDIAL ISCHEMIA, UNSPECIFIED ISCHEMIC CHEST PAIN TYPE: ICD-10-CM

## 2023-01-31 PROCEDURE — 99214 OFFICE O/P EST MOD 30 MIN: CPT | Performed by: INTERNAL MEDICINE

## 2023-01-31 RX ORDER — BUTALBITAL, ACETAMINOPHEN AND CAFFEINE 50; 325; 40 MG/1; MG/1; MG/1
TABLET ORAL AS NEEDED
COMMUNITY

## 2023-01-31 RX ORDER — CARVEDILOL 6.25 MG/1
6.25 TABLET ORAL 2 TIMES DAILY
Qty: 180 TABLET | Refills: 3 | Status: SHIPPED | OUTPATIENT
Start: 2023-01-31

## 2023-01-31 RX ORDER — CLOTRIMAZOLE 1 %
CREAM (GRAM) TOPICAL
COMMUNITY

## 2023-01-31 RX ORDER — GALCANEZUMAB 100 MG/ML
INJECTION, SOLUTION SUBCUTANEOUS
COMMUNITY

## 2023-01-31 RX ORDER — FUROSEMIDE 40 MG/1
TABLET ORAL DAILY
COMMUNITY

## 2023-02-23 ENCOUNTER — HOSPITAL ENCOUNTER (EMERGENCY)
Facility: HOSPITAL | Age: 48
Discharge: HOME | End: 2023-02-23
Admitting: EMERGENCY MEDICINE
Payer: MEDICAID

## 2023-02-23 ENCOUNTER — APPOINTMENT (OUTPATIENT)
Dept: CT IMAGING | Facility: HOSPITAL | Age: 48
End: 2023-02-23
Payer: MEDICAID

## 2023-02-23 VITALS
RESPIRATION RATE: 20 BRPM | TEMPERATURE: 98 F | HEART RATE: 74 BPM | DIASTOLIC BLOOD PRESSURE: 81 MMHG | OXYGEN SATURATION: 99 % | WEIGHT: 172.84 LBS | HEIGHT: 62 IN | BODY MASS INDEX: 31.81 KG/M2 | SYSTOLIC BLOOD PRESSURE: 133 MMHG

## 2023-02-23 DIAGNOSIS — B34.9 VIRAL ILLNESS: ICD-10-CM

## 2023-02-23 DIAGNOSIS — R10.84 GENERALIZED ABDOMINAL PAIN: Primary | ICD-10-CM

## 2023-02-23 LAB
ALBUMIN SERPL-MCNC: 4.1 G/DL (ref 3.5–5.2)
ALBUMIN/GLOB SERPL: 1.6 G/DL
ALP SERPL-CCNC: 75 U/L (ref 39–117)
ALT SERPL W P-5'-P-CCNC: 17 U/L (ref 1–33)
ANION GAP SERPL CALCULATED.3IONS-SCNC: 11 MMOL/L (ref 5–15)
AST SERPL-CCNC: 18 U/L (ref 1–32)
BASOPHILS # BLD AUTO: 0.1 10*3/MM3 (ref 0–0.2)
BASOPHILS NFR BLD AUTO: 1 % (ref 0–1.5)
BILIRUB SERPL-MCNC: 0.2 MG/DL (ref 0–1.2)
BILIRUB UR QL STRIP: NEGATIVE
BUN SERPL-MCNC: 8 MG/DL (ref 6–20)
BUN/CREAT SERPL: 11.9 (ref 7–25)
CALCIUM SPEC-SCNC: 8.8 MG/DL (ref 8.6–10.5)
CHLORIDE SERPL-SCNC: 107 MMOL/L (ref 98–107)
CLARITY UR: CLEAR
CO2 SERPL-SCNC: 22 MMOL/L (ref 22–29)
COLOR UR: YELLOW
CREAT SERPL-MCNC: 0.67 MG/DL (ref 0.57–1)
DEPRECATED RDW RBC AUTO: 43.3 FL (ref 37–54)
EGFRCR SERPLBLD CKD-EPI 2021: 108 ML/MIN/1.73
EOSINOPHIL # BLD AUTO: 0 10*3/MM3 (ref 0–0.4)
EOSINOPHIL NFR BLD AUTO: 0.7 % (ref 0.3–6.2)
ERYTHROCYTE [DISTWIDTH] IN BLOOD BY AUTOMATED COUNT: 12.7 % (ref 12.3–15.4)
GLOBULIN UR ELPH-MCNC: 2.5 GM/DL
GLUCOSE SERPL-MCNC: 86 MG/DL (ref 65–99)
GLUCOSE UR STRIP-MCNC: NEGATIVE MG/DL
HCT VFR BLD AUTO: 38.1 % (ref 34–46.6)
HGB BLD-MCNC: 13 G/DL (ref 12–15.9)
HGB UR QL STRIP.AUTO: NEGATIVE
KETONES UR QL STRIP: ABNORMAL
LEUKOCYTE ESTERASE UR QL STRIP.AUTO: NEGATIVE
LIPASE SERPL-CCNC: 29 U/L (ref 13–60)
LYMPHOCYTES # BLD AUTO: 2.4 10*3/MM3 (ref 0.7–3.1)
LYMPHOCYTES NFR BLD AUTO: 37 % (ref 19.6–45.3)
MCH RBC QN AUTO: 31.5 PG (ref 26.6–33)
MCHC RBC AUTO-ENTMCNC: 34.1 G/DL (ref 31.5–35.7)
MCV RBC AUTO: 92.4 FL (ref 79–97)
MONOCYTES # BLD AUTO: 0.5 10*3/MM3 (ref 0.1–0.9)
MONOCYTES NFR BLD AUTO: 7.6 % (ref 5–12)
NEUTROPHILS NFR BLD AUTO: 3.5 10*3/MM3 (ref 1.7–7)
NEUTROPHILS NFR BLD AUTO: 53.7 % (ref 42.7–76)
NITRITE UR QL STRIP: NEGATIVE
NRBC BLD AUTO-RTO: 0.1 /100 WBC (ref 0–0.2)
PH UR STRIP.AUTO: <=5 [PH] (ref 5–8)
PLATELET # BLD AUTO: 410 10*3/MM3 (ref 140–450)
PMV BLD AUTO: 7.3 FL (ref 6–12)
POTASSIUM SERPL-SCNC: 3.6 MMOL/L (ref 3.5–5.2)
PROT SERPL-MCNC: 6.6 G/DL (ref 6–8.5)
PROT UR QL STRIP: NEGATIVE
RBC # BLD AUTO: 4.12 10*6/MM3 (ref 3.77–5.28)
SODIUM SERPL-SCNC: 140 MMOL/L (ref 136–145)
SP GR UR STRIP: 1.02 (ref 1–1.03)
UROBILINOGEN UR QL STRIP: ABNORMAL
WBC NRBC COR # BLD: 6.6 10*3/MM3 (ref 3.4–10.8)

## 2023-02-23 PROCEDURE — 96361 HYDRATE IV INFUSION ADD-ON: CPT

## 2023-02-23 PROCEDURE — 25510000001 IOPAMIDOL 61 % SOLUTION: Performed by: PHYSICIAN ASSISTANT

## 2023-02-23 PROCEDURE — 25010000002 ONDANSETRON PER 1 MG: Performed by: PHYSICIAN ASSISTANT

## 2023-02-23 PROCEDURE — 25010000002 MORPHINE PER 10 MG: Performed by: PHYSICIAN ASSISTANT

## 2023-02-23 PROCEDURE — 74177 CT ABD & PELVIS W/CONTRAST: CPT

## 2023-02-23 PROCEDURE — 96376 TX/PRO/DX INJ SAME DRUG ADON: CPT

## 2023-02-23 PROCEDURE — 85025 COMPLETE CBC W/AUTO DIFF WBC: CPT | Performed by: PHYSICIAN ASSISTANT

## 2023-02-23 PROCEDURE — 80053 COMPREHEN METABOLIC PANEL: CPT | Performed by: PHYSICIAN ASSISTANT

## 2023-02-23 PROCEDURE — 96374 THER/PROPH/DIAG INJ IV PUSH: CPT

## 2023-02-23 PROCEDURE — 81003 URINALYSIS AUTO W/O SCOPE: CPT | Performed by: PHYSICIAN ASSISTANT

## 2023-02-23 PROCEDURE — 83690 ASSAY OF LIPASE: CPT | Performed by: PHYSICIAN ASSISTANT

## 2023-02-23 PROCEDURE — 96375 TX/PRO/DX INJ NEW DRUG ADDON: CPT

## 2023-02-23 PROCEDURE — 99284 EMERGENCY DEPT VISIT MOD MDM: CPT

## 2023-02-23 RX ORDER — FAMOTIDINE 20 MG/1
20 TABLET, FILM COATED ORAL 2 TIMES DAILY
Qty: 14 TABLET | Refills: 0 | Status: SHIPPED | OUTPATIENT
Start: 2023-02-23

## 2023-02-23 RX ORDER — ONDANSETRON 2 MG/ML
4 INJECTION INTRAMUSCULAR; INTRAVENOUS ONCE
Status: COMPLETED | OUTPATIENT
Start: 2023-02-23 | End: 2023-02-23

## 2023-02-23 RX ORDER — ONDANSETRON 4 MG/1
4 TABLET, ORALLY DISINTEGRATING ORAL EVERY 8 HOURS PRN
Qty: 10 TABLET | Refills: 0 | Status: SHIPPED | OUTPATIENT
Start: 2023-02-23

## 2023-02-23 RX ORDER — SODIUM CHLORIDE 0.9 % (FLUSH) 0.9 %
10 SYRINGE (ML) INJECTION AS NEEDED
Status: DISCONTINUED | OUTPATIENT
Start: 2023-02-23 | End: 2023-02-23 | Stop reason: HOSPADM

## 2023-02-23 RX ADMIN — IOPAMIDOL 100 ML: 612 INJECTION, SOLUTION INTRAVENOUS at 17:06

## 2023-02-23 RX ADMIN — MORPHINE SULFATE 4 MG: 4 INJECTION INTRAVENOUS at 15:21

## 2023-02-23 RX ADMIN — MORPHINE SULFATE 4 MG: 4 INJECTION INTRAVENOUS at 17:59

## 2023-02-23 RX ADMIN — ONDANSETRON 4 MG: 2 INJECTION INTRAMUSCULAR; INTRAVENOUS at 14:48

## 2023-02-23 RX ADMIN — SODIUM CHLORIDE 1000 ML: 9 INJECTION, SOLUTION INTRAVENOUS at 15:21

## 2023-02-23 NOTE — ED PROVIDER NOTES
"Subjective   History of Present Illness  Patient is a 47-year-old female with past medical history of diverticulosis who presents to the ER with acute  diarrhea and severe left lower abdominal pain x1 week that has progressively worsened.  Patient states she discussed her symptoms with her PCP last week and again today and they had advised that she come to the ER.  Patient states her symptoms are very similar to past diverticulitis flares.  She states the pain is in her lower left abdomen that radiates to her back and middle abdomen.  She rates the pain 8 out of 10.  Patient states she has been taking Norco for pain without relief.  Patient admits to 2 episodes of bowel incontinence yesterday.  Patient also admits to \"greasy and foamy\" stool with foul odor, as well as chills, nausea, decreased appetite.  Patient states she had a colonoscopy about a year ago in which they tattooed affected area of her sigmoid colon that may need resection in the future if she experiences continued flares.  Patient denies any urinary symptoms, chest pain, shortness of breath, headache, lightheadedness.        Review of Systems   Constitutional: Negative.    HENT: Negative.    Eyes: Negative for photophobia and visual disturbance.   Respiratory: Negative.    Cardiovascular: Negative.    Gastrointestinal: Positive for abdominal pain and diarrhea. Negative for abdominal distention, nausea and vomiting.   Genitourinary: Negative for decreased urine volume, difficulty urinating, dysuria, flank pain, frequency, hematuria and urgency.   Musculoskeletal: Negative for back pain, neck pain and neck stiffness.   Skin: Negative.    Neurological: Negative.    Hematological: Negative.        Past Medical History:   Diagnosis Date   • Abnormal ECG 12/12/2022   • Acetaminophen abuse     Takes 3 Excedrin Migraine daily   • Congenital heart disease 12/12/2022   • Congestive heart failure (HCC) 12/20/2022   • Heart murmur 2002    It was obly noticed " "when i was pregnant   • Hypertension    • IBS (irritable bowel syndrome)    • Migraines        Allergies   Allergen Reactions   • Penicillins Unknown - High Severity and Swelling     Tolerated cefepime   • Dilaudid [Hydromorphone Hcl] Headache       Past Surgical History:   Procedure Laterality Date   • ABDOMINAL SURGERY      Patient states that after last  section, she was told that her organs had fused together and the surgeon had to \"rebuild \"her abdomen.   •  SECTION     • CHOLECYSTECTOMY     • HYSTERECTOMY     • TUMOR REMOVAL Left     Fatty tumor left lower abdomen       Family History   Problem Relation Age of Onset   • Cancer Mother    • Anemia Mother    • Arrhythmia Mother    • Clotting disorder Mother    • Fainting Mother    • Heart attack Mother    • Heart disease Mother    • Heart failure Mother    • Heart disease Maternal Grandfather         Harley had heart disease and a stroke   • Heart attack Maternal Grandfather    • Heart attack Brother    • Heart disease Brother    • Heart failure Brother    • Hypertension Brother        Social History     Socioeconomic History   • Marital status:    Tobacco Use   • Smoking status: Some Days     Packs/day: 0.50     Years: 22.00     Pack years: 11.00     Types: Cigarettes, Electronic Cigarette     Start date: 10/10/1995     Last attempt to quit: 8/15/2017     Years since quittin.5   • Smokeless tobacco: Never   • Tobacco comments:     Stopped smoking cigarettes. Picked up vaping shortly after   Vaping Use   • Vaping Use: Some days   • Substances: Nicotine   • Devices: Refillable tank   Substance and Sexual Activity   • Alcohol use: Not Currently     Comment: Occasionally. Holidays or nights out with family or friends   • Drug use: Never   • Sexual activity: Yes     Partners: Male     Birth control/protection: Hysterectomy           Objective   Physical Exam  Vitals and nursing note reviewed.   Constitutional:       General: She is not in " "acute distress.     Appearance: She is well-developed. She is obese. She is not ill-appearing, toxic-appearing or diaphoretic.   HENT:      Head: Normocephalic and atraumatic.      Mouth/Throat:      Mouth: Mucous membranes are moist.      Pharynx: Oropharynx is clear.   Eyes:      General: No scleral icterus.     Extraocular Movements: Extraocular movements intact.      Pupils: Pupils are equal, round, and reactive to light.   Cardiovascular:      Rate and Rhythm: Normal rate and regular rhythm.      Pulses: Normal pulses.      Heart sounds: No murmur heard.    No friction rub. No gallop.   Pulmonary:      Effort: Pulmonary effort is normal. No respiratory distress.      Breath sounds: Normal breath sounds. No stridor. No wheezing, rhonchi or rales.   Chest:      Chest wall: No tenderness.   Abdominal:      General: Bowel sounds are normal. There is no distension. There are no signs of injury.      Palpations: Abdomen is soft. There is no fluid wave, hepatomegaly, splenomegaly or mass.      Tenderness: There is abdominal tenderness in the periumbilical area, suprapubic area, left upper quadrant and left lower quadrant. There is no right CVA tenderness, left CVA tenderness, guarding or rebound.      Hernia: No hernia is present.   Musculoskeletal:      Cervical back: Normal range of motion. No rigidity.   Skin:     General: Skin is warm.      Capillary Refill: Capillary refill takes less than 2 seconds.      Coloration: Skin is not cyanotic, jaundiced or pale.      Findings: No rash.   Neurological:      General: No focal deficit present.      Mental Status: She is alert and oriented to person, place, and time.   Psychiatric:         Mood and Affect: Mood normal.         Behavior: Behavior normal.         Procedures           ED Course    /81   Pulse 80   Temp 98.2 °F (36.8 °C) (Oral)   Resp 18   Ht 157.5 cm (62\")   Wt 78.4 kg (172 lb 13.5 oz)   SpO2 100%   BMI 31.61 kg/m²   Medications   sodium " chloride 0.9 % flush 10 mL (has no administration in time range)   morphine injection 4 mg (has no administration in time range)   ondansetron (ZOFRAN) injection 4 mg (4 mg Intravenous Given 2/23/23 1448)   morphine injection 4 mg (4 mg Intravenous Given 2/23/23 1521)   sodium chloride 0.9 % bolus 1,000 mL (1,000 mL Intravenous New Bag 2/23/23 1521)   iopamidol (ISOVUE-300) 61 % injection 100 mL (100 mL Intravenous Given 2/23/23 1706)     Labs Reviewed   URINALYSIS W/ MICROSCOPIC IF INDICATED (NO CULTURE) - Abnormal; Notable for the following components:       Result Value    Ketones, UA Trace (*)     All other components within normal limits    Narrative:     Urine microscopic not indicated.   LIPASE - Normal   CBC WITH AUTO DIFFERENTIAL - Normal   COMPREHENSIVE METABOLIC PANEL    Narrative:     GFR Normal >60  Chronic Kidney Disease <60  Kidney Failure <15     CBC AND DIFFERENTIAL    Narrative:     The following orders were created for panel order CBC & Differential.  Procedure                               Abnormality         Status                     ---------                               -----------         ------                     CBC Auto Differential[[id number]]        Normal              Final result                 Please view results for these tests on the individual orders.     CT Abdomen Pelvis With Contrast    Result Date: 2/23/2023  Impression: 1. No acute abnormality. 2. Prior hysterectomy and cholecystectomy. 3. Extensive colonic diverticulosis without diverticulitis. Electronically Signed: Amauri Cameron  2/23/2023 5:18 PM EST  Workstation ID: [id number]                                           Medical Decision Making  Comorbidity: As per past medical history    Differentials:  intra-abdominal infection, dissection, peritonitis, peptic ulcer disease, pancreatitis, hepatitis, ischemic bowel, bowel obstruction, appendicitis     ;this list is not all inclusive and does not constitute the entirety of  considered causes  Labs: As above  Radiology: My interpretation of CT abdomen and pelvis shows no acute bowel obstruction correlated with radiologist interpretation as below  CT Abdomen Pelvis With Contrast   Final Result    Impression:    1. No acute abnormality.    2. Prior hysterectomy and cholecystectomy.    3. Extensive colonic diverticulosis without diverticulitis.        Electronically Signed: Amauri Nisha      2/23/2023 5:18 PM EST      Workstation ID: [id number]    Disposition/Treatment:  Appropriate PPE was worn during exam and throughout all encounters with the patient.  When the ED IV was placed and labs were obtained patient was placed on proper monitor she was afebrile and appeared nontoxic was given morphine and Zofran for pain and nausea.  Patient was also given fluids.    Lab results were significant for a normal white count of 6.6 and patient was hemodynamically stable.  Metabolic panel negative.  Lipase 29.  Urinalysis showed no signs of UTI    CT abdomen and pelvis was ordered to rule out acute infection or obstruction CT abdomen pelvis chronic findings as above.    Findings were discussed with the patient and family at bedside who voiced understanding of discharge advised to follow-up with PCP and GI for further evaluation and management if her symptoms continue.  Patient is abdominal pain and diarrhea likely secondary to viral illness.  Patient voiced understanding of discharge along with signs and symptoms to return.    This document is intended for medical expert use only. Reading of this document by patients and/or patient's family without participating medical staff guidance may result in misinterpretation and unintended morbidity.  Any interpretation of such data is the responsibility of the patient and/or family member responsible for the patient in concert with their primary or specialist providers, not to be left for sources of online searches such as [employer], [employer] or similar queries.  Relying on these approaches to knowledge may result in misinterpretation, misguided goals of care and even death should patients or family members try recommendations outside of the realm of professional medical care in a supervised inpatient environment.       Generalized abdominal pain: acute illness or injury  Amount and/or Complexity of Data Reviewed  Labs: ordered.  Radiology: ordered.      Risk  Prescription drug management.          Final diagnoses:   Generalized abdominal pain   Viral illness       ED Disposition  ED Disposition     ED Disposition   Discharge    Condition   Stable    Comment   --             Trinidad Pickett, APRN  [address]  [facility] [address]  [place] [address] [phone]    Schedule an appointment as soon as possible for a visit in 3 days      [facility] EMERGENCY DEPARTMENT  [facility] [facility] [address] [phone]  Go to   If symptoms worsen    [facility]  [address] [facility] [address] [phone]  Schedule an appointment as soon as possible for a visit            Medication List      New Prescriptions    famotidine 20 MG tablet  Commonly known as: Pepcid  Take 1 tablet by mouth 2 (Two) Times a Day.     ondansetron ODT 4 MG disintegrating tablet  Commonly known as: ZOFRAN-ODT  Place 1 tablet on the tongue Every 8 (Eight) Hours As Needed for Nausea or Vomiting.           Where to Get Your Medications      These medications were sent to [id number] [place] - [address] [address] - [phone] [place] [phone]   [address] [facility] [place] [address]    Phone: [phone]   · famotidine 20 MG tablet  · ondansetron ODT 4 MG disintegrating tablet          Harmeet Washburn PA  02/23/23 6078

## 2023-02-23 NOTE — DISCHARGE INSTRUCTIONS
Drink plenty of clear fluids eat small bland meals.  Advance her diet as tolerated.    Take medications as directed.    Follow-up with gastroenterology for further evaluation management.    Follow-up with your primary care provider in 3-5 days.  If you do not have a primary care provider call [phone] for help in finding one, or you may follow up with [facility] at [phone].    Return to ED for any new or worsening symptoms

## 2025-01-23 ENCOUNTER — HOSPITAL ENCOUNTER (EMERGENCY)
Facility: HOSPITAL | Age: 50
Discharge: HOME | End: 2025-01-23
Attending: EMERGENCY MEDICINE
Payer: COMMERCIAL

## 2025-01-23 ENCOUNTER — APPOINTMENT (OUTPATIENT)
Dept: CT IMAGING | Facility: HOSPITAL | Age: 50
End: 2025-01-23
Payer: COMMERCIAL

## 2025-01-23 VITALS
TEMPERATURE: 98.1 F | WEIGHT: 174 LBS | BODY MASS INDEX: 32.02 KG/M2 | DIASTOLIC BLOOD PRESSURE: 50 MMHG | SYSTOLIC BLOOD PRESSURE: 104 MMHG | RESPIRATION RATE: 18 BRPM | OXYGEN SATURATION: 93 % | HEIGHT: 62 IN | HEART RATE: 69 BPM

## 2025-01-23 DIAGNOSIS — K57.92 ACUTE DIVERTICULITIS: Primary | ICD-10-CM

## 2025-01-23 LAB
ALBUMIN SERPL-MCNC: 4.3 G/DL (ref 3.5–5.2)
ALBUMIN/GLOB SERPL: 1.7 G/DL
ALP SERPL-CCNC: 133 U/L (ref 39–117)
ALT SERPL W P-5'-P-CCNC: 65 U/L (ref 1–33)
ANION GAP SERPL CALCULATED.3IONS-SCNC: 10.4 MMOL/L (ref 5–15)
AST SERPL-CCNC: 52 U/L (ref 1–32)
BASOPHILS # BLD AUTO: 0.1 10*3/MM3 (ref 0–0.2)
BASOPHILS NFR BLD AUTO: 0.6 % (ref 0–1.5)
BILIRUB SERPL-MCNC: 0.2 MG/DL (ref 0–1.2)
BILIRUB UR QL STRIP: NEGATIVE
BUN SERPL-MCNC: 12 MG/DL (ref 6–20)
BUN/CREAT SERPL: 15.6 (ref 7–25)
CALCIUM SPEC-SCNC: 9.5 MG/DL (ref 8.6–10.5)
CHLORIDE SERPL-SCNC: 103 MMOL/L (ref 98–107)
CLARITY UR: CLEAR
CO2 SERPL-SCNC: 26.6 MMOL/L (ref 22–29)
COLOR UR: YELLOW
CREAT SERPL-MCNC: 0.77 MG/DL (ref 0.57–1)
D DIMER PPP FEU-MCNC: 0.39 MCGFEU/ML (ref 0–0.5)
DEPRECATED RDW RBC AUTO: 47.1 FL (ref 37–54)
EGFRCR SERPLBLD CKD-EPI 2021: 94.1 ML/MIN/1.73
EOSINOPHIL # BLD AUTO: 0.24 10*3/MM3 (ref 0–0.4)
EOSINOPHIL NFR BLD AUTO: 1.4 % (ref 0.3–6.2)
ERYTHROCYTE [DISTWIDTH] IN BLOOD BY AUTOMATED COUNT: 13.7 % (ref 12.3–15.4)
GLOBULIN UR ELPH-MCNC: 2.6 GM/DL
GLUCOSE SERPL-MCNC: 127 MG/DL (ref 65–99)
GLUCOSE UR STRIP-MCNC: NEGATIVE MG/DL
HCT VFR BLD AUTO: 37.1 % (ref 34–46.6)
HGB BLD-MCNC: 11.9 G/DL (ref 12–15.9)
HGB UR QL STRIP.AUTO: NEGATIVE
IMM GRANULOCYTES # BLD AUTO: 0.07 10*3/MM3 (ref 0–0.05)
IMM GRANULOCYTES NFR BLD AUTO: 0.4 % (ref 0–0.5)
KETONES UR QL STRIP: NEGATIVE
LEUKOCYTE ESTERASE UR QL STRIP.AUTO: NEGATIVE
LIPASE SERPL-CCNC: 45 U/L (ref 13–60)
LYMPHOCYTES # BLD AUTO: 3.38 10*3/MM3 (ref 0.7–3.1)
LYMPHOCYTES NFR BLD AUTO: 19.7 % (ref 19.6–45.3)
MCH RBC QN AUTO: 30.2 PG (ref 26.6–33)
MCHC RBC AUTO-ENTMCNC: 32.1 G/DL (ref 31.5–35.7)
MCV RBC AUTO: 94.2 FL (ref 79–97)
MONOCYTES # BLD AUTO: 1.38 10*3/MM3 (ref 0.1–0.9)
MONOCYTES NFR BLD AUTO: 8 % (ref 5–12)
NEUTROPHILS NFR BLD AUTO: 12 10*3/MM3 (ref 1.7–7)
NEUTROPHILS NFR BLD AUTO: 69.9 % (ref 42.7–76)
NITRITE UR QL STRIP: NEGATIVE
NRBC BLD AUTO-RTO: 0 /100 WBC (ref 0–0.2)
PH UR STRIP.AUTO: 6.5 [PH] (ref 5–8)
PLATELET # BLD AUTO: 489 10*3/MM3 (ref 140–450)
PMV BLD AUTO: 9.5 FL (ref 6–12)
POTASSIUM SERPL-SCNC: 3.8 MMOL/L (ref 3.5–5.2)
PROT SERPL-MCNC: 6.9 G/DL (ref 6–8.5)
PROT UR QL STRIP: NEGATIVE
RBC # BLD AUTO: 3.94 10*6/MM3 (ref 3.77–5.28)
SODIUM SERPL-SCNC: 140 MMOL/L (ref 136–145)
SP GR UR STRIP: 1.01 (ref 1–1.03)
UROBILINOGEN UR QL STRIP: NORMAL
WBC NRBC COR # BLD AUTO: 17.17 10*3/MM3 (ref 3.4–10.8)

## 2025-01-23 PROCEDURE — 74177 CT ABD & PELVIS W/CONTRAST: CPT

## 2025-01-23 PROCEDURE — 85379 FIBRIN DEGRADATION QUANT: CPT | Performed by: EMERGENCY MEDICINE

## 2025-01-23 PROCEDURE — 96375 TX/PRO/DX INJ NEW DRUG ADDON: CPT

## 2025-01-23 PROCEDURE — 81003 URINALYSIS AUTO W/O SCOPE: CPT | Performed by: EMERGENCY MEDICINE

## 2025-01-23 PROCEDURE — 25010000002 ONDANSETRON PER 1 MG: Performed by: EMERGENCY MEDICINE

## 2025-01-23 PROCEDURE — 80053 COMPREHEN METABOLIC PANEL: CPT | Performed by: EMERGENCY MEDICINE

## 2025-01-23 PROCEDURE — 85025 COMPLETE CBC W/AUTO DIFF WBC: CPT | Performed by: EMERGENCY MEDICINE

## 2025-01-23 PROCEDURE — 83690 ASSAY OF LIPASE: CPT | Performed by: EMERGENCY MEDICINE

## 2025-01-23 PROCEDURE — 99285 EMERGENCY DEPT VISIT HI MDM: CPT

## 2025-01-23 PROCEDURE — 96374 THER/PROPH/DIAG INJ IV PUSH: CPT

## 2025-01-23 PROCEDURE — 25010000002 HYDROMORPHONE 1 MG/ML SOLUTION: Performed by: EMERGENCY MEDICINE

## 2025-01-23 PROCEDURE — 25510000001 IOPAMIDOL PER 1 ML: Performed by: EMERGENCY MEDICINE

## 2025-01-23 RX ORDER — IOPAMIDOL 755 MG/ML
100 INJECTION, SOLUTION INTRAVASCULAR
Status: COMPLETED | OUTPATIENT
Start: 2025-01-23 | End: 2025-01-23

## 2025-01-23 RX ORDER — ONDANSETRON 2 MG/ML
4 INJECTION INTRAMUSCULAR; INTRAVENOUS ONCE
Status: COMPLETED | OUTPATIENT
Start: 2025-01-23 | End: 2025-01-23

## 2025-01-23 RX ORDER — METRONIDAZOLE 500 MG/1
500 TABLET ORAL ONCE
Status: COMPLETED | OUTPATIENT
Start: 2025-01-23 | End: 2025-01-23

## 2025-01-23 RX ORDER — LEVOFLOXACIN 750 MG/1
750 TABLET, FILM COATED ORAL DAILY
Qty: 14 TABLET | Refills: 0 | Status: SHIPPED | OUTPATIENT
Start: 2025-01-23

## 2025-01-23 RX ORDER — HYDROCODONE BITARTRATE AND ACETAMINOPHEN 7.5; 325 MG/1; MG/1
1 TABLET ORAL ONCE
Status: COMPLETED | OUTPATIENT
Start: 2025-01-23 | End: 2025-01-23

## 2025-01-23 RX ORDER — ONDANSETRON 4 MG/1
4 TABLET, ORALLY DISINTEGRATING ORAL EVERY 6 HOURS PRN
Qty: 20 TABLET | Refills: 0 | Status: SHIPPED | OUTPATIENT
Start: 2025-01-23

## 2025-01-23 RX ORDER — METRONIDAZOLE 500 MG/1
500 TABLET ORAL 3 TIMES DAILY
Qty: 42 TABLET | Refills: 0 | Status: SHIPPED | OUTPATIENT
Start: 2025-01-23

## 2025-01-23 RX ORDER — LEVOFLOXACIN 750 MG/1
750 TABLET, FILM COATED ORAL ONCE
Status: COMPLETED | OUTPATIENT
Start: 2025-01-23 | End: 2025-01-23

## 2025-01-23 RX ORDER — SODIUM CHLORIDE 0.9 % (FLUSH) 0.9 %
10 SYRINGE (ML) INJECTION AS NEEDED
Status: DISCONTINUED | OUTPATIENT
Start: 2025-01-23 | End: 2025-01-23 | Stop reason: HOSPADM

## 2025-01-23 RX ADMIN — HYDROCODONE BITARTRATE AND ACETAMINOPHEN 1 TABLET: 7.5; 325 TABLET ORAL at 20:23

## 2025-01-23 RX ADMIN — METRONIDAZOLE 500 MG: 500 TABLET ORAL at 20:23

## 2025-01-23 RX ADMIN — IOPAMIDOL 100 ML: 755 INJECTION, SOLUTION INTRAVENOUS at 19:36

## 2025-01-23 RX ADMIN — HYDROMORPHONE HYDROCHLORIDE 0.5 MG: 1 INJECTION, SOLUTION INTRAMUSCULAR; INTRAVENOUS; SUBCUTANEOUS at 18:49

## 2025-01-23 RX ADMIN — ONDANSETRON 4 MG: 2 INJECTION INTRAMUSCULAR; INTRAVENOUS at 18:48

## 2025-01-23 RX ADMIN — LEVOFLOXACIN 750 MG: 750 TABLET, FILM COATED ORAL at 20:23

## 2025-01-23 NOTE — ED PROVIDER NOTES
"Subjective   History of Present Illness  Chief complaint: Patient is a 49-year-old man having severe abdominal pain yesterday around 4 PM.  She states that she has a persisting discomfort that becomes sharp and stabbing if she breathes in or bends.  Its on the right side of her abdomen.  She still has her appendix and but her gallbladder has been removed.  She has had diverticulitis in the past but this does not quite feel like that.  She chronically has loose stools and diarrhea.  She has not had any change there.  She has not been vomiting.  She has not had fever.    Context:    Duration:    Timing:    Severity: Severe    Associated Symptoms:        PCP:  LMP:      Review of Systems   Constitutional:  Negative for fever.   Respiratory: Negative.     Cardiovascular: Negative.    Gastrointestinal:  Positive for abdominal pain and diarrhea. Negative for nausea and vomiting.   Genitourinary: Negative.        Past Medical History:   Diagnosis Date    Abnormal ECG 2022    Acetaminophen abuse     Takes 3 Excedrin Migraine daily    Congenital heart disease 2022    Congestive heart failure 2022    Heart murmur     It was obly noticed when i was pregnant    Hypertension     IBS (irritable bowel syndrome)     Migraines        Allergies   Allergen Reactions    Penicillins Unknown - High Severity and Swelling     Tolerated cefepime    Dilaudid [Hydromorphone Hcl] Headache       Past Surgical History:   Procedure Laterality Date    ABDOMINAL SURGERY      Patient states that after last  section, she was told that her organs had fused together and the surgeon had to \"rebuild \"her abdomen.     SECTION      CHOLECYSTECTOMY      HYSTERECTOMY      TUMOR REMOVAL Left     Fatty tumor left lower abdomen       Family History   Problem Relation Age of Onset    Cancer Mother     Anemia Mother     Arrhythmia Mother     Clotting disorder Mother     Fainting Mother     Heart attack Mother     Heart " disease Mother     Heart failure Mother     Heart disease Maternal Grandfather         Harley had heart disease and a stroke    Heart attack Maternal Grandfather     Heart attack Brother     Heart disease Brother     Heart failure Brother     Hypertension Brother        Social History     Socioeconomic History    Marital status:    Tobacco Use    Smoking status: Some Days     Current packs/day: 0.00     Average packs/day: 0.5 packs/day for 22.0 years (11.0 ttl pk-yrs)     Types: Cigarettes, Electronic Cigarette     Start date: 10/10/1995     Last attempt to quit: 8/15/2017     Years since quittin.4    Smokeless tobacco: Never    Tobacco comments:     Stopped smoking cigarettes. Picked up vaping shortly after   Vaping Use    Vaping status: Some Days    Substances: Nicotine    Devices: Refillable tank   Substance and Sexual Activity    Alcohol use: Not Currently     Comment: Occasionally. Holidays or nights out with family or friends    Drug use: Never    Sexual activity: Yes     Partners: Male     Birth control/protection: Hysterectomy           Objective   Physical Exam  Vitals and nursing note reviewed.   Constitutional:       Appearance: She is obese.   HENT:      Head: Normocephalic and atraumatic.   Pulmonary:      Effort: Pulmonary effort is normal.      Breath sounds: Normal breath sounds.   Abdominal:      General: Abdomen is protuberant. Bowel sounds are normal.      Palpations: Abdomen is soft.      Tenderness: There is abdominal tenderness in the right upper quadrant and right lower quadrant. There is guarding.   Skin:     General: Skin is warm and dry.   Neurological:      Mental Status: She is alert and oriented to person, place, and time.   Psychiatric:         Mood and Affect: Mood is anxious.         Procedures           ED Course        Results for orders placed or performed during the hospital encounter of 25   Urinalysis With Culture If Indicated - Urine, Clean Catch    Collection  Time: 01/23/25  6:29 PM    Specimen: Urine, Clean Catch   Result Value Ref Range    Color, UA Yellow Yellow, Straw    Appearance, UA Clear Clear    pH, UA 6.5 5.0 - 8.0    Specific Gravity, UA 1.010 1.005 - 1.030    Glucose, UA Negative Negative    Ketones, UA Negative Negative    Bilirubin, UA Negative Negative    Blood, UA Negative Negative    Protein, UA Negative Negative    Leuk Esterase, UA Negative Negative    Nitrite, UA Negative Negative    Urobilinogen, UA 0.2 E.U./dL 0.2 - 1.0 E.U./dL   CBC Auto Differential    Collection Time: 01/23/25  6:29 PM    Specimen: Arm, Left; Blood   Result Value Ref Range    WBC 17.17 (H) 3.40 - 10.80 10*3/mm3    RBC 3.94 3.77 - 5.28 10*6/mm3    Hemoglobin 11.9 (L) 12.0 - 15.9 g/dL    Hematocrit 37.1 34.0 - 46.6 %    MCV 94.2 79.0 - 97.0 fL    MCH 30.2 26.6 - 33.0 pg    MCHC 32.1 31.5 - 35.7 g/dL    RDW 13.7 12.3 - 15.4 %    RDW-SD 47.1 37.0 - 54.0 fl    MPV 9.5 6.0 - 12.0 fL    Platelets 489 (H) 140 - 450 10*3/mm3    Neutrophil % 69.9 42.7 - 76.0 %    Lymphocyte % 19.7 19.6 - 45.3 %    Monocyte % 8.0 5.0 - 12.0 %    Eosinophil % 1.4 0.3 - 6.2 %    Basophil % 0.6 0.0 - 1.5 %    Immature Grans % 0.4 0.0 - 0.5 %    Neutrophils, Absolute 12.00 (H) 1.70 - 7.00 10*3/mm3    Lymphocytes, Absolute 3.38 (H) 0.70 - 3.10 10*3/mm3    Monocytes, Absolute 1.38 (H) 0.10 - 0.90 10*3/mm3    Eosinophils, Absolute 0.24 0.00 - 0.40 10*3/mm3    Basophils, Absolute 0.10 0.00 - 0.20 10*3/mm3    Immature Grans, Absolute 0.07 (H) 0.00 - 0.05 10*3/mm3    nRBC 0.0 0.0 - 0.2 /100 WBC   Comprehensive Metabolic Panel    Collection Time: 01/23/25  6:53 PM    Specimen: Arm, Left; Blood   Result Value Ref Range    Glucose 127 (H) 65 - 99 mg/dL    BUN 12 6 - 20 mg/dL    Creatinine 0.77 0.57 - 1.00 mg/dL    Sodium 140 136 - 145 mmol/L    Potassium 3.8 3.5 - 5.2 mmol/L    Chloride 103 98 - 107 mmol/L    CO2 26.6 22.0 - 29.0 mmol/L    Calcium 9.5 8.6 - 10.5 mg/dL    Total Protein 6.9 6.0 - 8.5 g/dL    Albumin 4.3  3.5 - 5.2 g/dL    ALT (SGPT) 65 (H) 1 - 33 U/L    AST (SGOT) 52 (H) 1 - 32 U/L    Alkaline Phosphatase 133 (H) 39 - 117 U/L    Total Bilirubin 0.2 0.0 - 1.2 mg/dL    Globulin 2.6 gm/dL    A/G Ratio 1.7 g/dL    BUN/Creatinine Ratio 15.6 7.0 - 25.0    Anion Gap 10.4 5.0 - 15.0 mmol/L    eGFR 94.1 >60.0 mL/min/1.73   Lipase    Collection Time: 01/23/25  6:53 PM    Specimen: Arm, Left; Blood   Result Value Ref Range    Lipase 45 13 - 60 U/L   D-dimer, Quantitative    Collection Time: 01/23/25  6:53 PM    Specimen: Arm, Left; Blood   Result Value Ref Range    D-Dimer, Quantitative 0.39 0.00 - 0.50 MCGFEU/mL                                        CT Abdomen Pelvis With Contrast    Result Date: 1/23/2025  Diverticulitis of the transverse colon. Normal appendix Electronically Signed: Neal Batista MD  1/23/2025 7:59 PM EST  Workstation ID: [id number]               Medical Decision Making  Patient was seen evaluate for the above problem    Differential diagnosis includes but is not limited to appendicitis, diverticulitis, bowel obstruction, bowel perforation,    Patient did present.  Her abdomen was not rigid.  However she was having tenderness in the right side of her abdomen.  She did have a white count of 17.  She was sent for CT scan.  She was given pain medication.  CT scan did reveal acute diverticulitis.  There is no abscess or perforation.  Appendix was normal.  On reevaluation patient is feeling some improvement.  I discussed at this point in time with the diverticulitis and elevated white count admission versus discharge.  She really does not want to be admitted.  I discussed if she cannot keep down her antibiotics or has development of fever or worsening pain she needs to return immediately.  She verbalizes understanding is okay with this.  However at this point in time she declines admission to the hospital and would like to try outpatient management.  She received Levaquin and Flagyl here in the emergency  department.    Problems Addressed:  Acute diverticulitis: complicated acute illness or injury    Amount and/or Complexity of Data Reviewed  Labs: ordered. Decision-making details documented in ED Course.     Details: Labs reviewed by myself  Radiology: ordered and independent interpretation performed.     Details: CT reviewed by myself    Risk  Prescription drug management.  Parenteral controlled substances.  Decision regarding hospitalization.        Final diagnoses:   None   Acute diverticulitis    ED Disposition  ED Disposition       None            No follow-up provider specified.       Medication List      No changes were made to your prescriptions during this visit.            Valentin Fuentes,   01/23/25 2030

## 2025-03-03 ENCOUNTER — APPOINTMENT (OUTPATIENT)
Dept: CT IMAGING | Facility: HOSPITAL | Age: 50
End: 2025-03-03
Payer: COMMERCIAL

## 2025-03-03 ENCOUNTER — ANESTHESIA (OUTPATIENT)
Dept: PERIOP | Facility: HOSPITAL | Age: 50
End: 2025-03-03
Payer: COMMERCIAL

## 2025-03-03 ENCOUNTER — HOSPITAL ENCOUNTER (EMERGENCY)
Facility: HOSPITAL | Age: 50
Discharge: HOME | End: 2025-03-03
Payer: COMMERCIAL

## 2025-03-03 ENCOUNTER — ANESTHESIA EVENT (OUTPATIENT)
Dept: PERIOP | Facility: HOSPITAL | Age: 50
End: 2025-03-03
Payer: COMMERCIAL

## 2025-03-03 VITALS
HEART RATE: 102 BPM | DIASTOLIC BLOOD PRESSURE: 66 MMHG | WEIGHT: 174.16 LBS | BODY MASS INDEX: 32.05 KG/M2 | OXYGEN SATURATION: 95 % | RESPIRATION RATE: 14 BRPM | HEIGHT: 62 IN | SYSTOLIC BLOOD PRESSURE: 109 MMHG | TEMPERATURE: 98.4 F

## 2025-03-03 DIAGNOSIS — K35.80 ACUTE APPENDICITIS, UNSPECIFIED ACUTE APPENDICITIS TYPE: Primary | ICD-10-CM

## 2025-03-03 DIAGNOSIS — K37 APPENDICITIS: ICD-10-CM

## 2025-03-03 DIAGNOSIS — R10.32 LLQ ABDOMINAL PAIN: ICD-10-CM

## 2025-03-03 DIAGNOSIS — R10.31 RLQ ABDOMINAL PAIN: ICD-10-CM

## 2025-03-03 LAB
ALBUMIN SERPL-MCNC: 4.4 G/DL (ref 3.5–5.2)
ALBUMIN/GLOB SERPL: 1.9 G/DL
ALP SERPL-CCNC: 111 U/L (ref 39–117)
ALT SERPL W P-5'-P-CCNC: 26 U/L (ref 1–33)
ANION GAP SERPL CALCULATED.3IONS-SCNC: 13 MMOL/L (ref 5–15)
AST SERPL-CCNC: 25 U/L (ref 1–32)
BASOPHILS # BLD AUTO: 0.11 10*3/MM3 (ref 0–0.2)
BASOPHILS NFR BLD AUTO: 0.6 % (ref 0–1.5)
BILIRUB SERPL-MCNC: 0.2 MG/DL (ref 0–1.2)
BILIRUB UR QL STRIP: NEGATIVE
BUN SERPL-MCNC: 9 MG/DL (ref 6–20)
BUN/CREAT SERPL: 11.8 (ref 7–25)
CALCIUM SPEC-SCNC: 9.4 MG/DL (ref 8.6–10.5)
CHLORIDE SERPL-SCNC: 106 MMOL/L (ref 98–107)
CLARITY UR: CLEAR
CO2 SERPL-SCNC: 24 MMOL/L (ref 22–29)
COLOR UR: YELLOW
CREAT SERPL-MCNC: 0.76 MG/DL (ref 0.57–1)
D-LACTATE SERPL-SCNC: 0.6 MMOL/L (ref 0.3–2)
DEPRECATED RDW RBC AUTO: 44.7 FL (ref 37–54)
EGFRCR SERPLBLD CKD-EPI 2021: 95.6 ML/MIN/1.73
EOSINOPHIL # BLD AUTO: 0.25 10*3/MM3 (ref 0–0.4)
EOSINOPHIL NFR BLD AUTO: 1.3 % (ref 0.3–6.2)
ERYTHROCYTE [DISTWIDTH] IN BLOOD BY AUTOMATED COUNT: 13 % (ref 12.3–15.4)
GLOBULIN UR ELPH-MCNC: 2.3 GM/DL
GLUCOSE SERPL-MCNC: 86 MG/DL (ref 65–99)
GLUCOSE UR STRIP-MCNC: NEGATIVE MG/DL
HCT VFR BLD AUTO: 38 % (ref 34–46.6)
HGB BLD-MCNC: 12.1 G/DL (ref 12–15.9)
HGB UR QL STRIP.AUTO: NEGATIVE
HOLD SPECIMEN: NORMAL
IMM GRANULOCYTES # BLD AUTO: 0.07 10*3/MM3 (ref 0–0.05)
IMM GRANULOCYTES NFR BLD AUTO: 0.4 % (ref 0–0.5)
KETONES UR QL STRIP: NEGATIVE
LEUKOCYTE ESTERASE UR QL STRIP.AUTO: NEGATIVE
LIPASE SERPL-CCNC: 21 U/L (ref 13–60)
LYMPHOCYTES # BLD AUTO: 2.38 10*3/MM3 (ref 0.7–3.1)
LYMPHOCYTES NFR BLD AUTO: 12.5 % (ref 19.6–45.3)
MCH RBC QN AUTO: 29.8 PG (ref 26.6–33)
MCHC RBC AUTO-ENTMCNC: 31.8 G/DL (ref 31.5–35.7)
MCV RBC AUTO: 93.6 FL (ref 79–97)
MONOCYTES # BLD AUTO: 1.94 10*3/MM3 (ref 0.1–0.9)
MONOCYTES NFR BLD AUTO: 10.2 % (ref 5–12)
NEUTROPHILS NFR BLD AUTO: 14.35 10*3/MM3 (ref 1.7–7)
NEUTROPHILS NFR BLD AUTO: 75 % (ref 42.7–76)
NITRITE UR QL STRIP: NEGATIVE
NRBC BLD AUTO-RTO: 0 /100 WBC (ref 0–0.2)
PH UR STRIP.AUTO: 5.5 [PH] (ref 5–8)
PLATELET # BLD AUTO: 419 10*3/MM3 (ref 140–450)
PMV BLD AUTO: 8.8 FL (ref 6–12)
POTASSIUM SERPL-SCNC: 3.4 MMOL/L (ref 3.5–5.2)
PROT SERPL-MCNC: 6.7 G/DL (ref 6–8.5)
PROT UR QL STRIP: NEGATIVE
RBC # BLD AUTO: 4.06 10*6/MM3 (ref 3.77–5.28)
SODIUM SERPL-SCNC: 143 MMOL/L (ref 136–145)
SP GR UR STRIP: 1.01 (ref 1–1.03)
UROBILINOGEN UR QL STRIP: NORMAL
WBC NRBC COR # BLD AUTO: 19.1 10*3/MM3 (ref 3.4–10.8)
WHOLE BLOOD HOLD COAG: NORMAL

## 2025-03-03 PROCEDURE — 25010000002 MIDAZOLAM PER 1 MG: Performed by: NURSE ANESTHETIST, CERTIFIED REGISTERED

## 2025-03-03 PROCEDURE — 96374 THER/PROPH/DIAG INJ IV PUSH: CPT

## 2025-03-03 PROCEDURE — 83690 ASSAY OF LIPASE: CPT

## 2025-03-03 PROCEDURE — 25010000002 FENTANYL CITRATE (PF) 100 MCG/2ML SOLUTION: Performed by: NURSE ANESTHETIST, CERTIFIED REGISTERED

## 2025-03-03 PROCEDURE — 25010000002 LIDOCAINE PF 1% 1 % SOLUTION: Performed by: NURSE ANESTHETIST, CERTIFIED REGISTERED

## 2025-03-03 PROCEDURE — 85025 COMPLETE CBC W/AUTO DIFF WBC: CPT

## 2025-03-03 PROCEDURE — 74177 CT ABD & PELVIS W/CONTRAST: CPT

## 2025-03-03 PROCEDURE — 96376 TX/PRO/DX INJ SAME DRUG ADON: CPT

## 2025-03-03 PROCEDURE — 25010000002 METRONIDAZOLE 500 MG/100ML SOLUTION

## 2025-03-03 PROCEDURE — 25010000002 DEXAMETHASONE SODIUM PHOSPHATE 20 MG/5ML SOLUTION: Performed by: NURSE ANESTHETIST, CERTIFIED REGISTERED

## 2025-03-03 PROCEDURE — 25810000003 LACTATED RINGERS PER 1000 ML: Performed by: NURSE ANESTHETIST, CERTIFIED REGISTERED

## 2025-03-03 PROCEDURE — 44970 LAPAROSCOPY APPENDECTOMY: CPT | Performed by: STUDENT IN AN ORGANIZED HEALTH CARE EDUCATION/TRAINING PROGRAM

## 2025-03-03 PROCEDURE — 25510000001 IOPAMIDOL PER 1 ML

## 2025-03-03 PROCEDURE — 99285 EMERGENCY DEPT VISIT HI MDM: CPT

## 2025-03-03 PROCEDURE — 96375 TX/PRO/DX INJ NEW DRUG ADDON: CPT

## 2025-03-03 PROCEDURE — 83605 ASSAY OF LACTIC ACID: CPT

## 2025-03-03 PROCEDURE — 81003 URINALYSIS AUTO W/O SCOPE: CPT

## 2025-03-03 PROCEDURE — 25010000002 SUCCINYLCHOLINE PER 20 MG: Performed by: NURSE ANESTHETIST, CERTIFIED REGISTERED

## 2025-03-03 PROCEDURE — 25010000002 SUGAMMADEX 200 MG/2ML SOLUTION: Performed by: NURSE ANESTHETIST, CERTIFIED REGISTERED

## 2025-03-03 PROCEDURE — 87040 BLOOD CULTURE FOR BACTERIA: CPT

## 2025-03-03 PROCEDURE — 25010000002 HYDROMORPHONE 1 MG/ML SOLUTION: Performed by: ANESTHESIOLOGY

## 2025-03-03 PROCEDURE — 25010000002 KETOROLAC TROMETHAMINE PER 15 MG: Performed by: NURSE ANESTHETIST, CERTIFIED REGISTERED

## 2025-03-03 PROCEDURE — 25810000003 SODIUM CHLORIDE 0.9 % SOLUTION

## 2025-03-03 PROCEDURE — 25810000003 SODIUM CHLORIDE PER 500 ML: Performed by: STUDENT IN AN ORGANIZED HEALTH CARE EDUCATION/TRAINING PROGRAM

## 2025-03-03 PROCEDURE — 25010000002 ONDANSETRON PER 1 MG: Performed by: NURSE ANESTHETIST, CERTIFIED REGISTERED

## 2025-03-03 PROCEDURE — 36415 COLL VENOUS BLD VENIPUNCTURE: CPT

## 2025-03-03 PROCEDURE — 25010000002 HYDROMORPHONE 1 MG/ML SOLUTION

## 2025-03-03 PROCEDURE — S0260 H&P FOR SURGERY: HCPCS | Performed by: STUDENT IN AN ORGANIZED HEALTH CARE EDUCATION/TRAINING PROGRAM

## 2025-03-03 PROCEDURE — 25010000002 CEFEPIME PER 500 MG

## 2025-03-03 PROCEDURE — 25010000002 ONDANSETRON PER 1 MG

## 2025-03-03 PROCEDURE — 88304 TISSUE EXAM BY PATHOLOGIST: CPT | Performed by: STUDENT IN AN ORGANIZED HEALTH CARE EDUCATION/TRAINING PROGRAM

## 2025-03-03 PROCEDURE — 25010000002 PROPOFOL 10 MG/ML EMULSION: Performed by: NURSE ANESTHETIST, CERTIFIED REGISTERED

## 2025-03-03 PROCEDURE — 80053 COMPREHEN METABOLIC PANEL: CPT

## 2025-03-03 PROCEDURE — 25010000002 BUPIVACAINE (PF) 0.25 % SOLUTION: Performed by: STUDENT IN AN ORGANIZED HEALTH CARE EDUCATION/TRAINING PROGRAM

## 2025-03-03 DEVICE — THE ECHELON, ECHELON ENDOPATH™ AND ECHELON FLEX™ FAMILIES OF ENDOSCOPIC LINEAR CUTTERS AND RELOADS ARE STERILE, SINGLE PATIENT USE INSTRUMENTS THAT SIMULTANEOUSLY CUT AND STAPLE TISSUE. THERE ARE SIX STAGGERED ROWS OF STAPLES, THREE ON EITHER SIDE OF THE CUT LINE. THE 45 MM INSTRUMENTS HAVE A STAPLE LINE THATIS APPROXIMATELY 45 MM LONG AND A CUT LINE THAT IS APPROXIMATELY 42 MM LONG. THE SHAFT CAN ROTATE FREELY IN BOTH DIRECTIONS AND AN ARTICULATION MECHANISM ON ARTICULATING INSTRUMENTS ENABLES BENDING THE DISTAL PORTIONOF THE SHAFT TO FACILITATE LATERAL ACCESS OF THE OPERATIVE SITE.THE INSTRUMENTS ARE SHIPPED WITHOUT A RELOAD AND MUST BE LOADED PRIOR TO USE. A STAPLE RETAINING CAP ON THE RELOAD PROTECTS THE STAPLE LEG POINTS DURING SHIPPING AND TRANSPORTATION. THE INSTRUMENTS’ LOCK-OUT FEATURE IS DESIGNED TO PREVENT A USED RELOAD FROM BEING REFIRED.
Type: IMPLANTABLE DEVICE | Site: ABDOMEN | Status: FUNCTIONAL
Brand: ECHELON ENDOPATH

## 2025-03-03 RX ORDER — BUPIVACAINE HYDROCHLORIDE 2.5 MG/ML
INJECTION, SOLUTION EPIDURAL; INFILTRATION; INTRACAUDAL AS NEEDED
Status: DISCONTINUED | OUTPATIENT
Start: 2025-03-03 | End: 2025-03-03 | Stop reason: HOSPADM

## 2025-03-03 RX ORDER — KETOROLAC TROMETHAMINE 30 MG/ML
INJECTION, SOLUTION INTRAMUSCULAR; INTRAVENOUS AS NEEDED
Status: DISCONTINUED | OUTPATIENT
Start: 2025-03-03 | End: 2025-03-03 | Stop reason: SURG

## 2025-03-03 RX ORDER — FENTANYL CITRATE 50 UG/ML
INJECTION, SOLUTION INTRAMUSCULAR; INTRAVENOUS AS NEEDED
Status: DISCONTINUED | OUTPATIENT
Start: 2025-03-03 | End: 2025-03-03 | Stop reason: SURG

## 2025-03-03 RX ORDER — FENTANYL CITRATE 50 UG/ML
50 INJECTION, SOLUTION INTRAMUSCULAR; INTRAVENOUS
Status: DISCONTINUED | OUTPATIENT
Start: 2025-03-03 | End: 2025-03-03 | Stop reason: HOSPADM

## 2025-03-03 RX ORDER — SULFAMETHOXAZOLE AND TRIMETHOPRIM 800; 160 MG/1; MG/1
1 TABLET ORAL 2 TIMES DAILY
Qty: 14 TABLET | Refills: 0 | Status: SHIPPED | OUTPATIENT
Start: 2025-03-03 | End: 2025-03-07

## 2025-03-03 RX ORDER — IOPAMIDOL 755 MG/ML
100 INJECTION, SOLUTION INTRAVASCULAR
Status: COMPLETED | OUTPATIENT
Start: 2025-03-03 | End: 2025-03-03

## 2025-03-03 RX ORDER — PROMETHAZINE HYDROCHLORIDE 25 MG/1
25 SUPPOSITORY RECTAL ONCE AS NEEDED
Status: DISCONTINUED | OUTPATIENT
Start: 2025-03-03 | End: 2025-03-03 | Stop reason: HOSPADM

## 2025-03-03 RX ORDER — ONDANSETRON 2 MG/ML
4 INJECTION INTRAMUSCULAR; INTRAVENOUS ONCE AS NEEDED
Status: DISCONTINUED | OUTPATIENT
Start: 2025-03-03 | End: 2025-03-03 | Stop reason: HOSPADM

## 2025-03-03 RX ORDER — PROCHLORPERAZINE EDISYLATE 5 MG/ML
10 INJECTION INTRAMUSCULAR; INTRAVENOUS EVERY 6 HOURS PRN
Status: DISCONTINUED | OUTPATIENT
Start: 2025-03-03 | End: 2025-03-03 | Stop reason: HOSPADM

## 2025-03-03 RX ORDER — DEXAMETHASONE SODIUM PHOSPHATE 4 MG/ML
INJECTION, SOLUTION INTRA-ARTICULAR; INTRALESIONAL; INTRAMUSCULAR; INTRAVENOUS; SOFT TISSUE AS NEEDED
Status: DISCONTINUED | OUTPATIENT
Start: 2025-03-03 | End: 2025-03-03 | Stop reason: SURG

## 2025-03-03 RX ORDER — LIDOCAINE HYDROCHLORIDE 10 MG/ML
INJECTION, SOLUTION EPIDURAL; INFILTRATION; INTRACAUDAL; PERINEURAL AS NEEDED
Status: DISCONTINUED | OUTPATIENT
Start: 2025-03-03 | End: 2025-03-03 | Stop reason: SURG

## 2025-03-03 RX ORDER — SODIUM CHLORIDE 0.9 % (FLUSH) 0.9 %
10 SYRINGE (ML) INJECTION AS NEEDED
Status: DISCONTINUED | OUTPATIENT
Start: 2025-03-03 | End: 2025-03-03 | Stop reason: HOSPADM

## 2025-03-03 RX ORDER — POLYETHYLENE GLYCOL 3350 17 G/17G
17 POWDER, FOR SOLUTION ORAL DAILY
Qty: 14 EACH | Refills: 0 | Status: SHIPPED | OUTPATIENT
Start: 2025-03-03 | End: 2025-03-07

## 2025-03-03 RX ORDER — ONDANSETRON 4 MG/1
4 TABLET, FILM COATED ORAL EVERY 8 HOURS PRN
Qty: 30 TABLET | Refills: 1 | Status: SHIPPED | OUTPATIENT
Start: 2025-03-03 | End: 2025-03-07

## 2025-03-03 RX ORDER — HYDROCODONE BITARTRATE AND ACETAMINOPHEN 5; 325 MG/1; MG/1
1 TABLET ORAL ONCE
Status: COMPLETED | OUTPATIENT
Start: 2025-03-03 | End: 2025-03-03

## 2025-03-03 RX ORDER — IPRATROPIUM BROMIDE AND ALBUTEROL SULFATE 2.5; .5 MG/3ML; MG/3ML
3 SOLUTION RESPIRATORY (INHALATION) ONCE AS NEEDED
Status: DISCONTINUED | OUTPATIENT
Start: 2025-03-03 | End: 2025-03-03 | Stop reason: HOSPADM

## 2025-03-03 RX ORDER — SUCCINYLCHOLINE CHLORIDE 20 MG/ML
INJECTION INTRAMUSCULAR; INTRAVENOUS AS NEEDED
Status: DISCONTINUED | OUTPATIENT
Start: 2025-03-03 | End: 2025-03-03 | Stop reason: SURG

## 2025-03-03 RX ORDER — ONDANSETRON 2 MG/ML
4 INJECTION INTRAMUSCULAR; INTRAVENOUS ONCE
Status: COMPLETED | OUTPATIENT
Start: 2025-03-03 | End: 2025-03-03

## 2025-03-03 RX ORDER — PROPOFOL 10 MG/ML
VIAL (ML) INTRAVENOUS AS NEEDED
Status: DISCONTINUED | OUTPATIENT
Start: 2025-03-03 | End: 2025-03-03 | Stop reason: SURG

## 2025-03-03 RX ORDER — METRONIDAZOLE 500 MG/100ML
500 INJECTION, SOLUTION INTRAVENOUS ONCE
Status: COMPLETED | OUTPATIENT
Start: 2025-03-03 | End: 2025-03-03

## 2025-03-03 RX ORDER — ONDANSETRON 2 MG/ML
INJECTION INTRAMUSCULAR; INTRAVENOUS AS NEEDED
Status: DISCONTINUED | OUTPATIENT
Start: 2025-03-03 | End: 2025-03-03 | Stop reason: SURG

## 2025-03-03 RX ORDER — DIPHENHYDRAMINE HYDROCHLORIDE 50 MG/ML
12.5 INJECTION INTRAMUSCULAR; INTRAVENOUS
Status: DISCONTINUED | OUTPATIENT
Start: 2025-03-03 | End: 2025-03-03 | Stop reason: HOSPADM

## 2025-03-03 RX ORDER — ATROPINE SULFATE 0.4 MG/ML
0.4 INJECTION, SOLUTION INTRAMUSCULAR; INTRAVENOUS; SUBCUTANEOUS ONCE AS NEEDED
Status: DISCONTINUED | OUTPATIENT
Start: 2025-03-03 | End: 2025-03-03 | Stop reason: HOSPADM

## 2025-03-03 RX ORDER — DEXMEDETOMIDINE HYDROCHLORIDE 100 UG/ML
INJECTION, SOLUTION INTRAVENOUS AS NEEDED
Status: DISCONTINUED | OUTPATIENT
Start: 2025-03-03 | End: 2025-03-03 | Stop reason: SURG

## 2025-03-03 RX ORDER — NALOXONE HCL 0.4 MG/ML
0.2 VIAL (ML) INJECTION AS NEEDED
Status: DISCONTINUED | OUTPATIENT
Start: 2025-03-03 | End: 2025-03-03 | Stop reason: HOSPADM

## 2025-03-03 RX ORDER — SODIUM CHLORIDE, SODIUM LACTATE, POTASSIUM CHLORIDE, CALCIUM CHLORIDE 600; 310; 30; 20 MG/100ML; MG/100ML; MG/100ML; MG/100ML
9 INJECTION, SOLUTION INTRAVENOUS CONTINUOUS PRN
Status: DISCONTINUED | OUTPATIENT
Start: 2025-03-03 | End: 2025-03-03 | Stop reason: HOSPADM

## 2025-03-03 RX ORDER — FLUMAZENIL 0.1 MG/ML
0.2 INJECTION INTRAVENOUS AS NEEDED
Status: DISCONTINUED | OUTPATIENT
Start: 2025-03-03 | End: 2025-03-03 | Stop reason: HOSPADM

## 2025-03-03 RX ORDER — SODIUM CHLORIDE 9 MG/ML
INJECTION, SOLUTION INTRAVENOUS AS NEEDED
Status: DISCONTINUED | OUTPATIENT
Start: 2025-03-03 | End: 2025-03-03 | Stop reason: HOSPADM

## 2025-03-03 RX ORDER — SODIUM CHLORIDE, SODIUM LACTATE, POTASSIUM CHLORIDE, CALCIUM CHLORIDE 600; 310; 30; 20 MG/100ML; MG/100ML; MG/100ML; MG/100ML
INJECTION, SOLUTION INTRAVENOUS CONTINUOUS PRN
Status: DISCONTINUED | OUTPATIENT
Start: 2025-03-03 | End: 2025-03-03 | Stop reason: SURG

## 2025-03-03 RX ORDER — SODIUM CHLORIDE 0.9 % (FLUSH) 0.9 %
10 SYRINGE (ML) INJECTION EVERY 12 HOURS SCHEDULED
Status: DISCONTINUED | OUTPATIENT
Start: 2025-03-03 | End: 2025-03-03 | Stop reason: HOSPADM

## 2025-03-03 RX ORDER — MIDAZOLAM HYDROCHLORIDE 1 MG/ML
INJECTION, SOLUTION INTRAMUSCULAR; INTRAVENOUS AS NEEDED
Status: DISCONTINUED | OUTPATIENT
Start: 2025-03-03 | End: 2025-03-03 | Stop reason: SURG

## 2025-03-03 RX ORDER — PROMETHAZINE HYDROCHLORIDE 25 MG/1
25 TABLET ORAL ONCE AS NEEDED
Status: DISCONTINUED | OUTPATIENT
Start: 2025-03-03 | End: 2025-03-03 | Stop reason: HOSPADM

## 2025-03-03 RX ORDER — HYDROCODONE BITARTRATE AND ACETAMINOPHEN 5; 325 MG/1; MG/1
1 TABLET ORAL EVERY 6 HOURS PRN
Qty: 15 TABLET | Refills: 0 | Status: SHIPPED | OUTPATIENT
Start: 2025-03-03 | End: 2025-03-07

## 2025-03-03 RX ORDER — ROCURONIUM BROMIDE 10 MG/ML
INJECTION, SOLUTION INTRAVENOUS AS NEEDED
Status: DISCONTINUED | OUTPATIENT
Start: 2025-03-03 | End: 2025-03-03 | Stop reason: SURG

## 2025-03-03 RX ADMIN — DEXAMETHASONE SODIUM PHOSPHATE 8 MG: 4 INJECTION, SOLUTION INTRAMUSCULAR; INTRAVENOUS at 17:32

## 2025-03-03 RX ADMIN — SUGAMMADEX 200 MG: 100 INJECTION, SOLUTION INTRAVENOUS at 18:08

## 2025-03-03 RX ADMIN — FENTANYL CITRATE 50 MCG: 50 INJECTION, SOLUTION INTRAMUSCULAR; INTRAVENOUS at 17:38

## 2025-03-03 RX ADMIN — DEXMEDETOMIDINE HYDROCHLORIDE 5 MCG: 100 INJECTION, SOLUTION INTRAVENOUS at 17:20

## 2025-03-03 RX ADMIN — IOPAMIDOL 100 ML: 755 INJECTION, SOLUTION INTRAVENOUS at 15:44

## 2025-03-03 RX ADMIN — SODIUM CHLORIDE 500 ML: 9 INJECTION, SOLUTION INTRAVENOUS at 15:28

## 2025-03-03 RX ADMIN — HYDROMORPHONE HYDROCHLORIDE 0.5 MG: 1 INJECTION, SOLUTION INTRAMUSCULAR; INTRAVENOUS; SUBCUTANEOUS at 16:08

## 2025-03-03 RX ADMIN — SODIUM CHLORIDE, SODIUM LACTATE, POTASSIUM CHLORIDE, AND CALCIUM CHLORIDE: .6; .31; .03; .02 INJECTION, SOLUTION INTRAVENOUS at 18:12

## 2025-03-03 RX ADMIN — FENTANYL CITRATE 50 MCG: 50 INJECTION, SOLUTION INTRAMUSCULAR; INTRAVENOUS at 17:20

## 2025-03-03 RX ADMIN — METRONIDAZOLE 500 MG: 500 INJECTION, SOLUTION INTRAVENOUS at 16:15

## 2025-03-03 RX ADMIN — MIDAZOLAM 2 MG: 1 INJECTION INTRAMUSCULAR; INTRAVENOUS at 17:20

## 2025-03-03 RX ADMIN — PROPOFOL 200 MG: 10 INJECTION, EMULSION INTRAVENOUS at 17:22

## 2025-03-03 RX ADMIN — ROCURONIUM BROMIDE 10 MG: 10 INJECTION, SOLUTION INTRAVENOUS at 17:22

## 2025-03-03 RX ADMIN — HYDROCODONE BITARTRATE AND ACETAMINOPHEN 1 TABLET: 5; 325 TABLET ORAL at 19:50

## 2025-03-03 RX ADMIN — LIDOCAINE HYDROCHLORIDE 50 MG: 10 INJECTION, SOLUTION EPIDURAL; INFILTRATION; INTRACAUDAL; PERINEURAL at 17:22

## 2025-03-03 RX ADMIN — ONDANSETRON 4 MG: 2 INJECTION INTRAMUSCULAR; INTRAVENOUS at 15:23

## 2025-03-03 RX ADMIN — KETOROLAC TROMETHAMINE 30 MG: 30 INJECTION, SOLUTION INTRAMUSCULAR at 18:04

## 2025-03-03 RX ADMIN — HYDROMORPHONE HYDROCHLORIDE 0.5 MG: 1 INJECTION, SOLUTION INTRAMUSCULAR; INTRAVENOUS; SUBCUTANEOUS at 15:21

## 2025-03-03 RX ADMIN — SODIUM CHLORIDE, SODIUM LACTATE, POTASSIUM CHLORIDE, AND CALCIUM CHLORIDE: .6; .31; .03; .02 INJECTION, SOLUTION INTRAVENOUS at 17:18

## 2025-03-03 RX ADMIN — DEXMEDETOMIDINE HYDROCHLORIDE 10 MCG: 100 INJECTION, SOLUTION INTRAVENOUS at 17:49

## 2025-03-03 RX ADMIN — HYDROMORPHONE HYDROCHLORIDE 1 MG: 1 INJECTION, SOLUTION INTRAMUSCULAR; INTRAVENOUS; SUBCUTANEOUS at 16:32

## 2025-03-03 RX ADMIN — ROCURONIUM BROMIDE 40 MG: 10 INJECTION, SOLUTION INTRAVENOUS at 17:38

## 2025-03-03 RX ADMIN — SUCCINYLCHOLINE CHLORIDE 100 MG: 20 INJECTION, SOLUTION INTRAMUSCULAR; INTRAVENOUS at 17:22

## 2025-03-03 RX ADMIN — CEFEPIME 2000 MG: 2 INJECTION, POWDER, FOR SOLUTION INTRAVENOUS at 17:26

## 2025-03-03 RX ADMIN — ONDANSETRON 4 MG: 2 INJECTION INTRAMUSCULAR; INTRAVENOUS at 18:01

## 2025-03-03 NOTE — ED PROVIDER NOTES
Subjective   History of Present Illness  Due to significant overcrowding in the emergency department patient was evaluated by myself in a hallway bed. This exam may be limited by privacy, noise level and the patient not wearing a hospital gown.  Explained to the patient our limitations and our overcrowding.  They were in agreement to continue the exam and treatment at this time.     49-year-old female with history of hypertension presents the ED today with complaints of left lower quadrant abdominal pain in the intermittently radiate into the left lower back, spreads into the periumbilical area that started last night.  Patient reports chronic loose stools but no change to this today.  Denies nausea or vomiting, fever, cough, congestion, shortness of breath, chest pain, UTI symptoms, abnormal vaginal discharge, black or bloody stool. Last ate 2 donuts at 11:30, had soda to drink PTA    PCP: Piyush        Review of Systems   Constitutional:  Negative for fever.   Respiratory:  Negative for cough and shortness of breath.    Cardiovascular:  Negative for chest pain.   Gastrointestinal:  Positive for abdominal pain and diarrhea (chronic). Negative for nausea and vomiting.       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    Congestive heart failure 12/20/2022    Heart murmur 2002    It was obly noticed when i was pregnant    Hypertension     IBS (irritable bowel syndrome)     Migraines        Allergies   Allergen Reactions    Penicillins Swelling and Unknown - High Severity     Beta lactam allergy details  Antibiotic reaction: unknown  Age at reaction: unknown  Dose to reaction time: unknown  Reason for antibiotic: unknown  Epinephrine required for reaction?: unknown  Tolerated antibiotics: unknown       Dilaudid [Hydromorphone Hcl] Headache       Past Surgical History:   Procedure Laterality Date     "ABDOMINAL SURGERY      Patient states that after last  section, she was told that her organs had fused together and the surgeon had to \"rebuild \"her abdomen.     SECTION      CHOLECYSTECTOMY      HYSTERECTOMY      TUMOR REMOVAL Left     Fatty tumor left lower abdomen       Family History   Problem Relation Age of Onset    Cancer Mother     Anemia Mother     Arrhythmia Mother     Clotting disorder Mother     Fainting Mother     Heart attack Mother     Heart disease Mother     Heart failure Mother     Heart disease Maternal Grandfather         Harley had heart disease and a stroke    Heart attack Maternal Grandfather     Heart attack Brother     Heart disease Brother     Heart failure Brother     Hypertension Brother        Social History     Socioeconomic History    Marital status:    Tobacco Use    Smoking status: Some Days     Current packs/day: 0.00     Average packs/day: 0.5 packs/day for 22.0 years (11.0 ttl pk-yrs)     Types: Cigarettes, Electronic Cigarette     Start date: 10/10/1995     Last attempt to quit: 8/15/2017     Years since quittin.5    Smokeless tobacco: Never    Tobacco comments:     Stopped smoking cigarettes. Picked up vaping shortly after   Vaping Use    Vaping status: Some Days    Substances: Nicotine    Devices: Refillable tank   Substance and Sexual Activity    Alcohol use: Not Currently     Comment: Occasionally. Holidays or nights out with family or friends    Drug use: Never    Sexual activity: Yes     Partners: Male     Birth control/protection: Hysterectomy           Objective   Physical Exam  Vitals reviewed.   Constitutional:       General: She is in acute distress.   HENT:      Head: Normocephalic.   Eyes:      Extraocular Movements: Extraocular movements intact.      Conjunctiva/sclera: Conjunctivae normal.   Cardiovascular:      Rate and Rhythm: Regular rhythm. Tachycardia present.      Pulses: Normal pulses.      Heart sounds: Normal heart sounds. " "  Pulmonary:      Effort: Pulmonary effort is normal.      Breath sounds: Normal breath sounds.   Abdominal:      General: Bowel sounds are normal.      Palpations: Abdomen is soft.      Tenderness: There is abdominal tenderness. There is guarding. There is no right CVA tenderness or left CVA tenderness.      Comments: Significant tenderness to the left lower quadrant with palpation along with periumbilical area.  Moderate tenderness to the right lower quadrant with palpation.  No CVA tenderness.   Musculoskeletal:         General: Normal range of motion.   Skin:     General: Skin is warm.   Neurological:      Mental Status: She is alert and oriented to person, place, and time.   Psychiatric:         Behavior: Behavior normal.      Comments: Patient is mildly anxious on assessment and tearful due to the pain         Procedures           ED Course  ED Course as of 03/03/25 1655   Mon Mar 03, 2025   1502 Requested repeat vitals and blood work [KB]   1559 Dr. Batista with radiology reports acute appendicitis on CT. Gen surgery consult placed [KB]   1600 Requested repeat vitals [KB]   1615 Spoke to Dr. Mcneal with general surgery regarding the acute appendicitis.  She last ate 2 donuts around 11:30 AM today and drink Sunkist prior to arrival.  Has been n.p.o. since ER course.  He initially states that anesthesia will not be able to put her to sleep due to the donut consumption earlier this morning and then called back and states that they are okay with putting her to sleep at this time so to send straight to the OR.  No admission needed at this time. [KB]      ED Course User Index  [KB] Campbell Alvarado, REJI      /85 (BP Location: Left arm, Patient Position: Lying)   Pulse (!) 122   Temp 98.3 °F (36.8 °C) (Oral)   Resp 22   Ht 157.5 cm (62\")   Wt 79 kg (174 lb 2.6 oz)   SpO2 99%   BMI 31.85 kg/m²   Labs Reviewed   COMPREHENSIVE METABOLIC PANEL - Abnormal; Notable for the following components:       Result " Value    Potassium 3.4 (*)     All other components within normal limits    Narrative:     GFR Categories in Chronic Kidney Disease (CKD)      GFR Category          GFR (mL/min/1.73)    Interpretation  G1                     90 or greater         Normal or high (1)  G2                      60-89                Mild decrease (1)  G3a                   45-59                Mild to moderate decrease  G3b                   30-44                Moderate to severe decrease  G4                    15-29                Severe decrease  G5                    14 or less           Kidney failure          (1)In the absence of evidence of kidney disease, neither GFR category G1 or G2 fulfill the criteria for CKD.    eGFR calculation 2021 CKD-EPI creatinine equation, which does not include race as a factor   CBC WITH AUTO DIFFERENTIAL - Abnormal; Notable for the following components:    WBC 19.10 (*)     Lymphocyte % 12.5 (*)     Neutrophils, Absolute 14.35 (*)     Monocytes, Absolute 1.94 (*)     Immature Grans, Absolute 0.07 (*)     All other components within normal limits   LIPASE - Normal   URINALYSIS W/ CULTURE IF INDICATED - Normal    Narrative:     In absence of clinical symptoms, the presence of pyuria, bacteria, and/or nitrites on the urinalysis result does not correlate with infection.  Urine microscopic not indicated.   POC LACTATE - Normal   BLOOD CULTURE   BLOOD CULTURE   CBC AND DIFFERENTIAL    Narrative:     The following orders were created for panel order CBC & Differential.  Procedure                               Abnormality         Status                     ---------                               -----------         ------                     CBC Auto Differential[[id number]]        Abnormal            Final result                 Please view results for these tests on the individual orders.   EXTRA TUBES    Narrative:     The following orders were created for panel order Extra Tubes.  Procedure                                Abnormality         Status                     ---------                               -----------         ------                     Gold Top - SST[[id number]]                                   Final result               Light Blue Top[[id number]]                                   Final result                 Please view results for these tests on the individual orders.   GOLD TOP - SST   LIGHT BLUE TOP     Medications   sodium chloride 0.9 % flush 10 mL ( Intravenous MAR Hold 3/3/25 1629)   cefepime 2000 mg IVPB in 100 mL NS (MBP) (has no administration in time range)   sodium chloride 0.9 % flush 10 mL (has no administration in time range)   sodium chloride 0.9 % flush 10 mL (has no administration in time range)   lactated ringers infusion (has no administration in time range)   HYDROmorphone (DILAUDID) injection 1 mg (1 mg Intravenous Given 3/3/25 1632)   HYDROmorphone (DILAUDID) injection 0.5 mg (0.5 mg Intravenous Given 3/3/25 1521)   ondansetron (ZOFRAN) injection 4 mg (4 mg Intravenous Given 3/3/25 1523)   sodium chloride 0.9 % bolus 500 mL (500 mL Intravenous New Bag 3/3/25 1528)   iopamidol (ISOVUE-370) 76 % injection 100 mL (100 mL Intravenous Given 3/3/25 1544)   metroNIDAZOLE (FLAGYL) IVPB 500 mg (500 mg Intravenous New Bag 3/3/25 1615)   HYDROmorphone (DILAUDID) injection 0.5 mg (0.5 mg Intravenous Given 3/3/25 1608)     CT Abdomen Pelvis With Contrast    Result Date: 3/3/2025  1.Acute appendicitis. No evidence of perforation. 2.Colonic diverticulosis without evidence of diverticulitis. 3.Additional chronic findings as described above. 4.This critical finding was discussed with SAPPHIRE Qiu at 3:55 p.m. on 3/3/2025 Electronically Signed: Neal Batista MD  3/3/2025 3:55 PM EST  Workstation ID: [id number]                                                    Medical Decision Making  Patient was seen for the above complaints.  IV was established and blood work was obtained to assess for electrolyte  abnormalities, infection.  White blood cell count 19.1, hemoglobin 12.1, lipase 21, electrolytes fairly within normal limits besides potassium 3.4, lactic 0.6, blood cultures currently pending.  Urinalysis negative.  Abdominal CT independently interpreted by the radiologist as:  1.Acute appendicitis. No evidence of perforation.   2.Colonic diverticulosis without evidence of diverticulitis.   3.Additional chronic findings as described above.     Patient started on cefepime and Flagyl due to screen positive for sepsis.  Was also given multiple doses of Dilaudid, Zofran and IV fluids during ER course.  Discussed with Dr. Mcneal with general surgery who states that the anesthesiologist is okay take the patient for surgery today.  Reports that patient will likely go home after surgery so no admission required at this time, since straight to the OR.  Discussed plan with patient who verbalized understanding was agreeable plan of care at this time.  Patient to go to the OR.    Problems Addressed:  Acute appendicitis, unspecified acute appendicitis type: acute illness or injury  LLQ abdominal pain: acute illness or injury  RLQ abdominal pain: acute illness or injury    Amount and/or Complexity of Data Reviewed  Labs: ordered. Decision-making details documented in ED Course.  Radiology: ordered and independent interpretation performed. Decision-making details documented in ED Course.    Risk  Prescription drug management.        Final diagnoses:   LLQ abdominal pain   RLQ abdominal pain   Acute appendicitis, unspecified acute appendicitis type       ED Disposition  ED Disposition       ED Disposition   Send to OR    Condition   --    Comment   --               No follow-up provider specified.       Medication List      No changes were made to your prescriptions during this visit.            Campbell Alvarado, APRN  03/03/25 9842

## 2025-03-03 NOTE — ANESTHESIA PROCEDURE NOTES
Airway  Urgency: elective    Date/Time: 3/3/2025 5:23 PM    General Information and Staff    Patient location during procedure: OR  CRNA/CAA: Aj Luque CRNA    Indications and Patient Condition  Indications for airway management: airway protection    Preoxygenated: yes  MILS maintained throughout  Mask difficulty assessment: 0 - not attempted    Final Airway Details  Final airway type: endotracheal airway      Successful airway: ETT  Cuffed: yes   Successful intubation technique: direct laryngoscopy and RSI  Facilitating devices/methods: cricoid pressure  Endotracheal tube insertion site: oral  Blade: Jaime  Blade size: 3  ETT size (mm): 7.0  Cormack-Lehane Classification: grade I - full view of glottis  Placement verified by: chest auscultation   Measured from: teeth  ETT/EBT  to teeth (cm): 22  Number of attempts at approach: 1  Assessment: lips, teeth, and gum same as pre-op and atraumatic intubation

## 2025-03-03 NOTE — BRIEF OP NOTE
APPENDECTOMY LAPAROSCOPIC  Progress Note    Mary Gentile  3/3/2025    Pre-op Diagnosis:   Appendicitis       Post-Op Diagnosis Codes:  Same    Procedure(s):      Procedure(s):  APPENDECTOMY LAPAROSCOPIC              Surgeon(s):  Pablo Mcneal MD    Anesthesia: General    Staff:   Circulator: Love Tinoco RN  Scrub Person: Latrice Rivas       Estimated Blood Loss: minimal    Urine Voided: * No values recorded between 3/3/2025  5:18 PM and 3/3/2025  6:13 PM *    Specimens:                Specimens       ID Source Type Tests Collected By Collected At Frozen?    A Large Intestine, Appendix Tissue TISSUE PATHOLOGY EXAM   Pablo Mcneal MD 3/3/25 4168 No    Description: appendix              Drains: * No LDAs found *    Findings: Appendicitis with contained perforation containing thick foul-smelling purulent fluid that was present before surgery      Complications: None          Pablo Mcneal MD     Date: 3/3/2025  Time: 18:30 EST

## 2025-03-03 NOTE — ANESTHESIA PREPROCEDURE EVALUATION
Anesthesia Evaluation     Patient summary reviewed and Nursing notes reviewed   no history of anesthetic complications:   NPO Solid Status: Waived due to emergency  NPO Liquid Status: Waived due to emergency           Airway   Dental      Pulmonary    (+) a smoker Current,  Cardiovascular     ECG reviewed  Patient on routine beta blocker    (+) hypertension, valvular problems/murmurs murmur, CHF       Neuro/Psych  (+) headaches  GI/Hepatic/Renal/Endo    (+) obesity    Musculoskeletal     Abdominal    Substance History      OB/GYN          Other        ROS/Med Hx Other: Additional History:  Congenital heart dz, fatigue, IBS, h/o colon perforation due to diverticulitis, acetaminophen abuse, colon tumor, acute appy    PSH:  HYSTERECTOMY TUMOR REMOVAL  CHOLECYSTECTOMY  SECTION  ABDOMINAL SURGERY               Anesthesia Plan    ASA 3 - emergent     general     (Patient identified; pre-operative vital signs, all relevant labs/studies, complete medical/surgical/anesthetic history, full medication list, full allergy list, and NPO status obtained/reviewed; physical assessment performed; anesthetic options, side effects, potential complications, risks, and benefits discussed; questions answered; written anesthesia consent obtained; patient cleared for procedure; anesthesia machine and equipment checked and functioning)  intravenous induction     Anesthetic plan, risks, benefits, and alternatives have been provided, discussed and informed consent has been obtained with: patient.    Plan discussed with CRNA and CAA.    CODE STATUS:

## 2025-03-04 ENCOUNTER — TELEPHONE (OUTPATIENT)
Dept: SURGERY | Facility: CLINIC | Age: 50
End: 2025-03-04
Payer: COMMERCIAL

## 2025-03-04 NOTE — ANESTHESIA POSTPROCEDURE EVALUATION
Patient: Mary Gentile    Procedure Summary       Date: 03/03/25 Room / Location: [facility] OR 08 / [facility] MAIN OR    Anesthesia Start: 1718 Anesthesia Stop: 1827    Procedure: APPENDECTOMY LAPAROSCOPIC (Abdomen) Diagnosis:     Surgeons: Pablo Mcneal MD Provider: Marek Mills MD    Anesthesia Type: general ASA Status: 3 - Emergent            Anesthesia Type: general    Vitals  Vitals Value Taken Time   /66 03/03/25 2010   Temp 98.4 °F (36.9 °C) 03/03/25 2010   Pulse 109 03/03/25 2012   Resp 14 03/03/25 2010   SpO2 96 % 03/03/25 2012   Vitals shown include unfiled device data.        Post Anesthesia Care and Evaluation    Patient location during evaluation: PACU  Patient participation: complete - patient participated  Level of consciousness: awake  Pain scale: See nurse's notes for pain score.  Pain management: adequate    Airway patency: patent  Anesthetic complications: No anesthetic complications  PONV Status: none  Cardiovascular status: acceptable  Respiratory status: acceptable and spontaneous ventilation  Hydration status: acceptable    Comments: Patient seen and examined postoperatively; vital signs stable; SpO2 greater than or equal to 90%; cardiopulmonary status stable; nausea/vomiting adequately controlled; pain adequately controlled; no apparent anesthesia complications; patient discharged from anesthesia care when discharge criteria were met

## 2025-03-05 LAB
LAB AP CASE REPORT: NORMAL
PATH REPORT.FINAL DX SPEC: NORMAL
PATH REPORT.GROSS SPEC: NORMAL

## 2025-03-07 ENCOUNTER — APPOINTMENT (OUTPATIENT)
Dept: CT IMAGING | Facility: HOSPITAL | Age: 50
End: 2025-03-07
Payer: COMMERCIAL

## 2025-03-07 ENCOUNTER — TELEPHONE (OUTPATIENT)
Dept: SURGERY | Facility: CLINIC | Age: 50
End: 2025-03-07
Payer: COMMERCIAL

## 2025-03-07 ENCOUNTER — HOSPITAL ENCOUNTER (OUTPATIENT)
Facility: HOSPITAL | Age: 50
Setting detail: OBSERVATION
Discharge: HOME | End: 2025-03-08
Attending: EMERGENCY MEDICINE | Admitting: EMERGENCY MEDICINE
Payer: COMMERCIAL

## 2025-03-07 DIAGNOSIS — Z90.49 STATUS POST APPENDECTOMY: Primary | ICD-10-CM

## 2025-03-07 DIAGNOSIS — R10.32 LEFT LOWER QUADRANT ABDOMINAL PAIN: ICD-10-CM

## 2025-03-07 PROBLEM — R10.9 ABDOMINAL PAIN: Status: ACTIVE | Noted: 2025-03-07

## 2025-03-07 LAB
ALBUMIN SERPL-MCNC: 3.7 G/DL (ref 3.5–5.2)
ALBUMIN/GLOB SERPL: 1.3 G/DL
ALP SERPL-CCNC: 155 U/L (ref 39–117)
ALT SERPL W P-5'-P-CCNC: 80 U/L (ref 1–33)
ANION GAP SERPL CALCULATED.3IONS-SCNC: 11.3 MMOL/L (ref 5–15)
AST SERPL-CCNC: 34 U/L (ref 1–32)
BASOPHILS # BLD AUTO: 0.09 10*3/MM3 (ref 0–0.2)
BASOPHILS NFR BLD AUTO: 0.9 % (ref 0–1.5)
BILIRUB SERPL-MCNC: <0.2 MG/DL (ref 0–1.2)
BILIRUB UR QL STRIP: NEGATIVE
BUN SERPL-MCNC: 9 MG/DL (ref 6–20)
BUN/CREAT SERPL: 13 (ref 7–25)
CALCIUM SPEC-SCNC: 9.4 MG/DL (ref 8.6–10.5)
CHLORIDE SERPL-SCNC: 102 MMOL/L (ref 98–107)
CLARITY UR: CLEAR
CO2 SERPL-SCNC: 25.7 MMOL/L (ref 22–29)
COLOR UR: YELLOW
CREAT SERPL-MCNC: 0.69 MG/DL (ref 0.57–1)
D-LACTATE SERPL-SCNC: 1.1 MMOL/L (ref 0.5–2)
DEPRECATED RDW RBC AUTO: 45.5 FL (ref 37–54)
EGFRCR SERPLBLD CKD-EPI 2021: 105.9 ML/MIN/1.73
EOSINOPHIL # BLD AUTO: 0.47 10*3/MM3 (ref 0–0.4)
EOSINOPHIL NFR BLD AUTO: 4.7 % (ref 0.3–6.2)
ERYTHROCYTE [DISTWIDTH] IN BLOOD BY AUTOMATED COUNT: 12.9 % (ref 12.3–15.4)
GLOBULIN UR ELPH-MCNC: 2.8 GM/DL
GLUCOSE SERPL-MCNC: 86 MG/DL (ref 65–99)
GLUCOSE UR STRIP-MCNC: NEGATIVE MG/DL
HCT VFR BLD AUTO: 35.1 % (ref 34–46.6)
HGB BLD-MCNC: 10.8 G/DL (ref 12–15.9)
HGB UR QL STRIP.AUTO: NEGATIVE
IMM GRANULOCYTES # BLD AUTO: 0.03 10*3/MM3 (ref 0–0.05)
IMM GRANULOCYTES NFR BLD AUTO: 0.3 % (ref 0–0.5)
KETONES UR QL STRIP: NEGATIVE
LEUKOCYTE ESTERASE UR QL STRIP.AUTO: NEGATIVE
LIPASE SERPL-CCNC: 33 U/L (ref 13–60)
LYMPHOCYTES # BLD AUTO: 2.12 10*3/MM3 (ref 0.7–3.1)
LYMPHOCYTES NFR BLD AUTO: 21 % (ref 19.6–45.3)
MCH RBC QN AUTO: 29.3 PG (ref 26.6–33)
MCHC RBC AUTO-ENTMCNC: 30.8 G/DL (ref 31.5–35.7)
MCV RBC AUTO: 95.1 FL (ref 79–97)
MONOCYTES # BLD AUTO: 1.09 10*3/MM3 (ref 0.1–0.9)
MONOCYTES NFR BLD AUTO: 10.8 % (ref 5–12)
NEUTROPHILS NFR BLD AUTO: 6.28 10*3/MM3 (ref 1.7–7)
NEUTROPHILS NFR BLD AUTO: 62.3 % (ref 42.7–76)
NITRITE UR QL STRIP: NEGATIVE
NRBC BLD AUTO-RTO: 0 /100 WBC (ref 0–0.2)
PH UR STRIP.AUTO: 7 [PH] (ref 5–8)
PLATELET # BLD AUTO: 506 10*3/MM3 (ref 140–450)
PMV BLD AUTO: 8.5 FL (ref 6–12)
POTASSIUM SERPL-SCNC: 3.8 MMOL/L (ref 3.5–5.2)
PROT SERPL-MCNC: 6.5 G/DL (ref 6–8.5)
PROT UR QL STRIP: NEGATIVE
RBC # BLD AUTO: 3.69 10*6/MM3 (ref 3.77–5.28)
SODIUM SERPL-SCNC: 139 MMOL/L (ref 136–145)
SP GR UR STRIP: 1.01 (ref 1–1.03)
UROBILINOGEN UR QL STRIP: NORMAL
WBC NRBC COR # BLD AUTO: 10.08 10*3/MM3 (ref 3.4–10.8)

## 2025-03-07 PROCEDURE — 83690 ASSAY OF LIPASE: CPT | Performed by: NURSE PRACTITIONER

## 2025-03-07 PROCEDURE — 96375 TX/PRO/DX INJ NEW DRUG ADDON: CPT

## 2025-03-07 PROCEDURE — 25010000002 ENOXAPARIN PER 10 MG: Performed by: NURSE PRACTITIONER

## 2025-03-07 PROCEDURE — 99285 EMERGENCY DEPT VISIT HI MDM: CPT

## 2025-03-07 PROCEDURE — 74177 CT ABD & PELVIS W/CONTRAST: CPT

## 2025-03-07 PROCEDURE — G0378 HOSPITAL OBSERVATION PER HR: HCPCS

## 2025-03-07 PROCEDURE — 25010000002 HYDROMORPHONE PER 4 MG: Performed by: NURSE PRACTITIONER

## 2025-03-07 PROCEDURE — 87040 BLOOD CULTURE FOR BACTERIA: CPT | Performed by: NURSE PRACTITIONER

## 2025-03-07 PROCEDURE — 81003 URINALYSIS AUTO W/O SCOPE: CPT | Performed by: NURSE PRACTITIONER

## 2025-03-07 PROCEDURE — P9612 CATHETERIZE FOR URINE SPEC: HCPCS

## 2025-03-07 PROCEDURE — 85025 COMPLETE CBC W/AUTO DIFF WBC: CPT | Performed by: NURSE PRACTITIONER

## 2025-03-07 PROCEDURE — 25010000002 ONDANSETRON PER 1 MG: Performed by: NURSE PRACTITIONER

## 2025-03-07 PROCEDURE — 25510000001 IOPAMIDOL PER 1 ML: Performed by: NURSE PRACTITIONER

## 2025-03-07 PROCEDURE — 36415 COLL VENOUS BLD VENIPUNCTURE: CPT

## 2025-03-07 PROCEDURE — 83605 ASSAY OF LACTIC ACID: CPT | Performed by: NURSE PRACTITIONER

## 2025-03-07 PROCEDURE — 96372 THER/PROPH/DIAG INJ SC/IM: CPT

## 2025-03-07 PROCEDURE — 80053 COMPREHEN METABOLIC PANEL: CPT | Performed by: NURSE PRACTITIONER

## 2025-03-07 PROCEDURE — 25010000002 DROPERIDOL PER 5 MG: Performed by: NURSE PRACTITIONER

## 2025-03-07 PROCEDURE — 25010000002 HYDROMORPHONE 1 MG/ML SOLUTION: Performed by: NURSE PRACTITIONER

## 2025-03-07 PROCEDURE — 96376 TX/PRO/DX INJ SAME DRUG ADON: CPT

## 2025-03-07 RX ORDER — FLUOXETINE HYDROCHLORIDE 40 MG/1
40 CAPSULE ORAL DAILY
COMMUNITY

## 2025-03-07 RX ORDER — LAMOTRIGINE 25 MG/1
25 TABLET ORAL 2 TIMES DAILY
COMMUNITY

## 2025-03-07 RX ORDER — ONDANSETRON 2 MG/ML
4 INJECTION INTRAMUSCULAR; INTRAVENOUS ONCE
Status: COMPLETED | OUTPATIENT
Start: 2025-03-07 | End: 2025-03-07

## 2025-03-07 RX ORDER — POLYETHYLENE GLYCOL 3350 17 G/17G
17 POWDER, FOR SOLUTION ORAL DAILY PRN
Status: DISCONTINUED | OUTPATIENT
Start: 2025-03-07 | End: 2025-03-08 | Stop reason: HOSPADM

## 2025-03-07 RX ORDER — ONDANSETRON 2 MG/ML
4 INJECTION INTRAMUSCULAR; INTRAVENOUS EVERY 6 HOURS PRN
Status: DISCONTINUED | OUTPATIENT
Start: 2025-03-07 | End: 2025-03-08 | Stop reason: HOSPADM

## 2025-03-07 RX ORDER — GABAPENTIN 800 MG/1
800 TABLET ORAL 3 TIMES DAILY
COMMUNITY

## 2025-03-07 RX ORDER — DROPERIDOL 2.5 MG/ML
2.5 INJECTION, SOLUTION INTRAMUSCULAR; INTRAVENOUS ONCE
Status: COMPLETED | OUTPATIENT
Start: 2025-03-07 | End: 2025-03-07

## 2025-03-07 RX ORDER — SODIUM CHLORIDE 0.9 % (FLUSH) 0.9 %
10 SYRINGE (ML) INJECTION EVERY 12 HOURS SCHEDULED
Status: DISCONTINUED | OUTPATIENT
Start: 2025-03-07 | End: 2025-03-08 | Stop reason: HOSPADM

## 2025-03-07 RX ORDER — METOPROLOL SUCCINATE 50 MG/1
50 TABLET, EXTENDED RELEASE ORAL DAILY
COMMUNITY

## 2025-03-07 RX ORDER — BISACODYL 5 MG/1
5 TABLET, DELAYED RELEASE ORAL DAILY PRN
Status: DISCONTINUED | OUTPATIENT
Start: 2025-03-07 | End: 2025-03-08 | Stop reason: HOSPADM

## 2025-03-07 RX ORDER — ENOXAPARIN SODIUM 100 MG/ML
40 INJECTION SUBCUTANEOUS DAILY
Status: DISCONTINUED | OUTPATIENT
Start: 2025-03-07 | End: 2025-03-08 | Stop reason: HOSPADM

## 2025-03-07 RX ORDER — AMOXICILLIN 250 MG
2 CAPSULE ORAL 2 TIMES DAILY PRN
Status: DISCONTINUED | OUTPATIENT
Start: 2025-03-07 | End: 2025-03-08 | Stop reason: HOSPADM

## 2025-03-07 RX ORDER — IOPAMIDOL 755 MG/ML
100 INJECTION, SOLUTION INTRAVASCULAR
Status: COMPLETED | OUTPATIENT
Start: 2025-03-07 | End: 2025-03-07

## 2025-03-07 RX ORDER — HYDROMORPHONE HYDROCHLORIDE 1 MG/ML
0.5 INJECTION, SOLUTION INTRAMUSCULAR; INTRAVENOUS; SUBCUTANEOUS
Status: COMPLETED | OUTPATIENT
Start: 2025-03-07 | End: 2025-03-07

## 2025-03-07 RX ORDER — BISACODYL 10 MG
10 SUPPOSITORY, RECTAL RECTAL DAILY PRN
Status: DISCONTINUED | OUTPATIENT
Start: 2025-03-07 | End: 2025-03-08 | Stop reason: HOSPADM

## 2025-03-07 RX ORDER — GABAPENTIN 400 MG/1
800 CAPSULE ORAL ONCE
Status: COMPLETED | OUTPATIENT
Start: 2025-03-08 | End: 2025-03-07

## 2025-03-07 RX ORDER — SODIUM CHLORIDE 9 MG/ML
40 INJECTION, SOLUTION INTRAVENOUS AS NEEDED
Status: DISCONTINUED | OUTPATIENT
Start: 2025-03-07 | End: 2025-03-08 | Stop reason: HOSPADM

## 2025-03-07 RX ORDER — SODIUM CHLORIDE 0.9 % (FLUSH) 0.9 %
10 SYRINGE (ML) INJECTION AS NEEDED
Status: DISCONTINUED | OUTPATIENT
Start: 2025-03-07 | End: 2025-03-08 | Stop reason: HOSPADM

## 2025-03-07 RX ADMIN — GABAPENTIN 800 MG: 400 CAPSULE ORAL at 23:31

## 2025-03-07 RX ADMIN — ONDANSETRON 4 MG: 2 INJECTION, SOLUTION INTRAMUSCULAR; INTRAVENOUS at 18:03

## 2025-03-07 RX ADMIN — Medication 5 MG: at 23:31

## 2025-03-07 RX ADMIN — HYDROMORPHONE HYDROCHLORIDE 0.5 MG: 1 INJECTION, SOLUTION INTRAMUSCULAR; INTRAVENOUS; SUBCUTANEOUS at 19:20

## 2025-03-07 RX ADMIN — Medication 10 ML: at 22:28

## 2025-03-07 RX ADMIN — IOPAMIDOL 100 ML: 755 INJECTION, SOLUTION INTRAVENOUS at 20:02

## 2025-03-07 RX ADMIN — ENOXAPARIN SODIUM 40 MG: 100 INJECTION SUBCUTANEOUS at 23:31

## 2025-03-07 RX ADMIN — HYDROMORPHONE HYDROCHLORIDE 0.5 MG: 1 INJECTION, SOLUTION INTRAMUSCULAR; INTRAVENOUS; SUBCUTANEOUS at 23:31

## 2025-03-07 RX ADMIN — HYDROMORPHONE HYDROCHLORIDE 1 MG: 1 INJECTION, SOLUTION INTRAMUSCULAR; INTRAVENOUS; SUBCUTANEOUS at 18:03

## 2025-03-07 RX ADMIN — HYDROMORPHONE HYDROCHLORIDE 0.5 MG: 1 INJECTION, SOLUTION INTRAMUSCULAR; INTRAVENOUS; SUBCUTANEOUS at 21:00

## 2025-03-07 RX ADMIN — DROPERIDOL 2.5 MG: 2.5 INJECTION, SOLUTION INTRAMUSCULAR; INTRAVENOUS at 22:28

## 2025-03-07 NOTE — ED PROVIDER NOTES
"Subjective   History of Present Illness  Patient is a 49-year-old female who had an appendectomy with Dr. Mcneal 5 days ago on Monday and states that she felt better on Tuesday and then on Wednesday she began to feel bad again and now her pain is so severe in the left lower quadrant which radiates into her back that she is unable to sit up straight she cannot cough or dress or wipe herself after seeing the restroom.  She does appear in acute pain upon my exam.  She denies any fever or chills      Review of Systems   Gastrointestinal:  Positive for abdominal pain.   Genitourinary:  Positive for flank pain.   Psychiatric/Behavioral:  The patient is nervous/anxious.        Past Medical History:   Diagnosis Date    Abnormal ECG 2022    Acetaminophen abuse     Takes 3 Excedrin Migraine daily    Congenital heart disease 2022    Congestive heart failure 2022    Heart murmur     It was obly noticed when i was pregnant    Hypertension     IBS (irritable bowel syndrome)     Migraines        Allergies   Allergen Reactions    Penicillins Swelling and Unknown - High Severity     Beta lactam allergy details  Antibiotic reaction: unknown  Age at reaction: unknown  Dose to reaction time: unknown  Reason for antibiotic: unknown  Epinephrine required for reaction?: unknown  Tolerated antibiotics: unknown       Dilaudid [Hydromorphone Hcl] Headache       Past Surgical History:   Procedure Laterality Date    ABDOMINAL SURGERY      Patient states that after last  section, she was told that her organs had fused together and the surgeon had to \"rebuild \"her abdomen.    APPENDECTOMY N/A 3/3/2025    Procedure: APPENDECTOMY LAPAROSCOPIC;  Surgeon: Pablo Mcneal MD;  Location: [facility] MAIN OR;  Service: General;  Laterality: N/A;     SECTION      CHOLECYSTECTOMY      HYSTERECTOMY      TUMOR REMOVAL Left     Fatty tumor left lower abdomen       Family History   Problem Relation Age of Onset    Cancer " Mother     Anemia Mother     Arrhythmia Mother     Clotting disorder Mother     Fainting Mother     Heart attack Mother     Heart disease Mother     Heart failure Mother     Heart disease Maternal Grandfather         Harley had heart disease and a stroke    Heart attack Maternal Grandfather     Heart attack Brother     Heart disease Brother     Heart failure Brother     Hypertension Brother        Social History     Socioeconomic History    Marital status:    Tobacco Use    Smoking status: Some Days     Current packs/day: 0.00     Average packs/day: 0.5 packs/day for 22.0 years (11.0 ttl pk-yrs)     Types: Cigarettes, Electronic Cigarette     Start date: 10/10/1995     Last attempt to quit: 8/15/2017     Years since quittin.5    Smokeless tobacco: Never    Tobacco comments:     Stopped smoking cigarettes. Picked up vaping shortly after   Vaping Use    Vaping status: Some Days    Substances: Nicotine    Devices: Refillable tank   Substance and Sexual Activity    Alcohol use: Not Currently     Comment: Occasionally. Holidays or nights out with family or friends    Drug use: Never    Sexual activity: Yes     Partners: Male     Birth control/protection: Hysterectomy           Objective   Physical Exam  Vitals reviewed.   Constitutional:       General: She is not in acute distress.     Appearance: She is well-developed. She is obese. She is not toxic-appearing.   HENT:      Head: Normocephalic and atraumatic.      Right Ear: External ear normal.      Left Ear: External ear normal.      Nose: Nose normal.      Mouth/Throat:      Mouth: Mucous membranes are moist.   Eyes:      Conjunctiva/sclera: Conjunctivae normal.      Pupils: Pupils are equal, round, and reactive to light.   Cardiovascular:      Rate and Rhythm: Normal rate and regular rhythm.      Heart sounds: Normal heart sounds.   Pulmonary:      Effort: Pulmonary effort is normal. No respiratory distress.      Breath sounds: Normal breath sounds. No  "wheezing.   Abdominal:      General: Abdomen is protuberant. Bowel sounds are decreased.      Palpations: Abdomen is soft.      Tenderness: There is abdominal tenderness in the epigastric area, suprapubic area and left lower quadrant. There is left CVA tenderness.      Hernia: No hernia is present.      Comments: Patient's abdomen is soft the incision sites from the laparotomy look fine with no redness no drainage   Musculoskeletal:         General: No deformity. Normal range of motion.      Cervical back: Normal range of motion and neck supple.   Skin:     General: Skin is warm and dry.      Capillary Refill: Capillary refill takes less than 2 seconds.   Neurological:      Mental Status: She is alert and oriented to person, place, and time.      GCS: GCS eye subscore is 4. GCS verbal subscore is 5. GCS motor subscore is 6.      Motor: No weakness.   Psychiatric:         Mood and Affect: Mood normal.         Behavior: Behavior normal.         Procedures           ED Course  ED Course as of 03/07/25 2220   Fri Mar 07, 2025   1912 Marked ready for CT [KW]   2012 Waiting for the CAT scan to be read [KW]   2049 Patient requesting more pain medication will be given 0.5 mg IV Dilaudid and we are still waiting for the CAT scan to be read [KW]   2129 Dr. Fredis ortiz [KW]      ED Course User Index  [KW] Ericka Hagan, APRN                                           /80   Pulse 96   Temp 98 °F (36.7 °C) (Oral)   Resp 16   Ht 152.4 cm (60\")   Wt 81.9 kg (180 lb 8.9 oz)   SpO2 93%   BMI 35.26 kg/m²   Labs Reviewed   COMPREHENSIVE METABOLIC PANEL - Abnormal; Notable for the following components:       Result Value    ALT (SGPT) 80 (*)     AST (SGOT) 34 (*)     Alkaline Phosphatase 155 (*)     All other components within normal limits    Narrative:     GFR Categories in Chronic Kidney Disease (CKD)      GFR Category          GFR (mL/min/1.73)    Interpretation  G1                     90 or greater         " Normal or high (1)  G2                      60-89                Mild decrease (1)  G3a                   45-59                Mild to moderate decrease  G3b                   30-44                Moderate to severe decrease  G4                    15-29                Severe decrease  G5                    14 or less           Kidney failure          (1)In the absence of evidence of kidney disease, neither GFR category G1 or G2 fulfill the criteria for CKD.    eGFR calculation 2021 CKD-EPI creatinine equation, which does not include race as a factor   CBC WITH AUTO DIFFERENTIAL - Abnormal; Notable for the following components:    RBC 3.69 (*)     Hemoglobin 10.8 (*)     MCHC 30.8 (*)     Platelets 506 (*)     Monocytes, Absolute 1.09 (*)     Eosinophils, Absolute 0.47 (*)     All other components within normal limits   LIPASE - Normal   LACTIC ACID, PLASMA - Normal   URINALYSIS W/ CULTURE IF INDICATED - Normal    Narrative:     In absence of clinical symptoms, the presence of pyuria, bacteria, and/or nitrites on the urinalysis result does not correlate with infection.  Urine microscopic not indicated.   BLOOD CULTURE   BLOOD CULTURE   BASIC METABOLIC PANEL   CBC WITH AUTO DIFFERENTIAL   CBC AND DIFFERENTIAL    Narrative:     The following orders were created for panel order CBC & Differential.  Procedure                               Abnormality         Status                     ---------                               -----------         ------                     CBC Auto Differential[[id number]]        Abnormal            Final result                 Please view results for these tests on the individual orders.     Medications   sodium chloride 0.9 % flush 10 mL (has no administration in time range)   droperidol (INAPSINE) injection 2.5 mg (has no administration in time range)   sodium chloride 0.9 % flush 10 mL (has no administration in time range)   sodium chloride 0.9 % flush 10 mL (has no administration in  time range)   sodium chloride 0.9 % infusion 40 mL (has no administration in time range)   ondansetron (ZOFRAN) injection 4 mg (has no administration in time range)   melatonin tablet 5 mg (has no administration in time range)   Pharmacy to Dose enoxaparin (LOVENOX) (has no administration in time range)   sennosides-docusate (PERICOLACE) 8.6-50 MG per tablet 2 tablet (has no administration in time range)     And   polyethylene glycol (MIRALAX) packet 17 g (has no administration in time range)     And   bisacodyl (DULCOLAX) EC tablet 5 mg (has no administration in time range)     And   bisacodyl (DULCOLAX) suppository 10 mg (has no administration in time range)   HYDROmorphone (DILAUDID) injection 0.5 mg (has no administration in time range)   enoxaparin sodium (LOVENOX) syringe 40 mg (has no administration in time range)   HYDROmorphone (DILAUDID) injection 1 mg (1 mg Intravenous Given 3/7/25 1803)   ondansetron (ZOFRAN) injection 4 mg (4 mg Intravenous Given 3/7/25 1803)   HYDROmorphone (DILAUDID) injection 0.5 mg (0.5 mg Intravenous Given 3/7/25 1920)   iopamidol (ISOVUE-370) 76 % injection 100 mL (100 mL Intravenous Given 3/7/25 2002)   HYDROmorphone (DILAUDID) injection 0.5 mg (0.5 mg Intravenous Given 3/7/25 2100)     CT Abdomen Pelvis With Contrast    Result Date: 3/7/2025  Postsurgical changes. Small collection to the right of the urinary bladder with some peripheral enhancement favored to represent a small postoperative seroma. Residual inflammation noted within the region. Underlying infectious component cannot be excluded. Colonic diverticulosis without evidence of diverticulitis. Electronically Signed: Neal Batista MD  3/7/2025 9:14 PM EST  Workstation ID: [id number]               Medical Decision Making  Patient is a 49-year-old female who comes in after having an appendectomy with Dr. Mcneal on Monday 5 days ago.  She comes in tonight with severe abdominal pain that is worse with movement.  She denies any  fever or chills this is worrisome for bowel obstruction intra-abdominal abscess pancreatitis diverticulitis this is not an all-inclusive list.  Patient had above exam and IV was established and blood work was obtained and reviewed and patient was found to have a 10,000 white count hemoglobin and hematocrit 10.8 and 35 respectively.  the patient's liver enzymes are slightly elevated with an ALT of 80 AST of 34 and alk phos of 155 chart review shows that 4 days ago they were within normal limits.  Patient was given IV Dilaudid and Zofran for pain and she was given a total of 2 mg of Dilaudid over several hours which really did not help her pain much she was then given some droperidol as well-    CT was obtained reviewed and read by myself Dr. Manzo and the radiologist as having some mild stranding and fluid collection to the right of the urinary bladder but no intra-abdominal infection or abscess noted.  I attempted to page Dr. Mcneal for consultation but felt the patient is not able to go home due to pain control and feel that it would best suit the patient if the surgeon saw her so she will be placed in ED observation to be seen by Dr. Mcneal in the morning.  Patient was agreeable to this plan of care.    Problems Addressed:  Left lower quadrant abdominal pain: complicated acute illness or injury  Status post appendectomy: complicated acute illness or injury    Amount and/or Complexity of Data Reviewed  Labs: ordered. Decision-making details documented in ED Course.  Radiology: ordered and independent interpretation performed. Decision-making details documented in ED Course.  ECG/medicine tests: ordered and independent interpretation performed. Decision-making details documented in ED Course.    Risk  OTC drugs.  Prescription drug management.        Final diagnoses:   Status post appendectomy   Left lower quadrant abdominal pain       ED Disposition  ED Disposition       ED Disposition   Intended Admit    Condition    --    Comment   --               No follow-up provider specified.       Medication List      No changes were made to your prescriptions during this visit.            Ericka Hagan, APRN  03/07/25 [id number]

## 2025-03-07 NOTE — TELEPHONE ENCOUNTER
Patient called stating that she is in so much pain that she is unable to roll over by herself. She says that when her  moves her she screams in pain. She states pain is on the left side, abdomen and back, burning with urination. No fever, or n/v. Advised patient to go to the ER.     Dr. Mcneal notified.

## 2025-03-08 VITALS
WEIGHT: 180.56 LBS | DIASTOLIC BLOOD PRESSURE: 48 MMHG | RESPIRATION RATE: 16 BRPM | BODY MASS INDEX: 35.45 KG/M2 | SYSTOLIC BLOOD PRESSURE: 91 MMHG | HEIGHT: 60 IN | OXYGEN SATURATION: 96 % | HEART RATE: 86 BPM | TEMPERATURE: 97.8 F

## 2025-03-08 LAB
ANION GAP SERPL CALCULATED.3IONS-SCNC: 10.3 MMOL/L (ref 5–15)
BACTERIA SPEC AEROBE CULT: NORMAL
BACTERIA SPEC AEROBE CULT: NORMAL
BASOPHILS # BLD AUTO: 0.07 10*3/MM3 (ref 0–0.2)
BASOPHILS NFR BLD AUTO: 0.7 % (ref 0–1.5)
BUN SERPL-MCNC: 7 MG/DL (ref 6–20)
BUN/CREAT SERPL: 10.4 (ref 7–25)
CALCIUM SPEC-SCNC: 9.4 MG/DL (ref 8.6–10.5)
CHLORIDE SERPL-SCNC: 102 MMOL/L (ref 98–107)
CO2 SERPL-SCNC: 27.7 MMOL/L (ref 22–29)
CREAT SERPL-MCNC: 0.67 MG/DL (ref 0.57–1)
DEPRECATED RDW RBC AUTO: 45.4 FL (ref 37–54)
EGFRCR SERPLBLD CKD-EPI 2021: 106.6 ML/MIN/1.73
EOSINOPHIL # BLD AUTO: 0.5 10*3/MM3 (ref 0–0.4)
EOSINOPHIL NFR BLD AUTO: 5.3 % (ref 0.3–6.2)
ERYTHROCYTE [DISTWIDTH] IN BLOOD BY AUTOMATED COUNT: 13.1 % (ref 12.3–15.4)
GLUCOSE SERPL-MCNC: 89 MG/DL (ref 65–99)
HCT VFR BLD AUTO: 32.7 % (ref 34–46.6)
HGB BLD-MCNC: 10.1 G/DL (ref 12–15.9)
IMM GRANULOCYTES # BLD AUTO: 0.03 10*3/MM3 (ref 0–0.05)
IMM GRANULOCYTES NFR BLD AUTO: 0.3 % (ref 0–0.5)
LYMPHOCYTES # BLD AUTO: 2.31 10*3/MM3 (ref 0.7–3.1)
LYMPHOCYTES NFR BLD AUTO: 24.6 % (ref 19.6–45.3)
MCH RBC QN AUTO: 29.2 PG (ref 26.6–33)
MCHC RBC AUTO-ENTMCNC: 30.9 G/DL (ref 31.5–35.7)
MCV RBC AUTO: 94.5 FL (ref 79–97)
MONOCYTES # BLD AUTO: 1.1 10*3/MM3 (ref 0.1–0.9)
MONOCYTES NFR BLD AUTO: 11.7 % (ref 5–12)
NEUTROPHILS NFR BLD AUTO: 5.37 10*3/MM3 (ref 1.7–7)
NEUTROPHILS NFR BLD AUTO: 57.4 % (ref 42.7–76)
NRBC BLD AUTO-RTO: 0 /100 WBC (ref 0–0.2)
PLATELET # BLD AUTO: 487 10*3/MM3 (ref 140–450)
PMV BLD AUTO: 8.7 FL (ref 6–12)
POTASSIUM SERPL-SCNC: 4 MMOL/L (ref 3.5–5.2)
RBC # BLD AUTO: 3.46 10*6/MM3 (ref 3.77–5.28)
SODIUM SERPL-SCNC: 140 MMOL/L (ref 136–145)
WBC NRBC COR # BLD AUTO: 9.38 10*3/MM3 (ref 3.4–10.8)

## 2025-03-08 PROCEDURE — 96368 THER/DIAG CONCURRENT INF: CPT

## 2025-03-08 PROCEDURE — 25010000002 METRONIDAZOLE 500 MG/100ML SOLUTION: Performed by: STUDENT IN AN ORGANIZED HEALTH CARE EDUCATION/TRAINING PROGRAM

## 2025-03-08 PROCEDURE — 96365 THER/PROPH/DIAG IV INF INIT: CPT

## 2025-03-08 PROCEDURE — 25010000002 DIPHENHYDRAMINE PER 50 MG: Performed by: PHYSICIAN ASSISTANT

## 2025-03-08 PROCEDURE — 99024 POSTOP FOLLOW-UP VISIT: CPT | Performed by: STUDENT IN AN ORGANIZED HEALTH CARE EDUCATION/TRAINING PROGRAM

## 2025-03-08 PROCEDURE — 85025 COMPLETE CBC W/AUTO DIFF WBC: CPT | Performed by: NURSE PRACTITIONER

## 2025-03-08 PROCEDURE — 25010000002 PROCHLORPERAZINE 10 MG/2ML SOLUTION: Performed by: PHYSICIAN ASSISTANT

## 2025-03-08 PROCEDURE — 25010000002 MORPHINE PER 10 MG: Performed by: PHYSICIAN ASSISTANT

## 2025-03-08 PROCEDURE — 25010000002 CEFTRIAXONE PER 250 MG: Performed by: STUDENT IN AN ORGANIZED HEALTH CARE EDUCATION/TRAINING PROGRAM

## 2025-03-08 PROCEDURE — 96376 TX/PRO/DX INJ SAME DRUG ADON: CPT

## 2025-03-08 PROCEDURE — 80048 BASIC METABOLIC PNL TOTAL CA: CPT | Performed by: NURSE PRACTITIONER

## 2025-03-08 PROCEDURE — G0378 HOSPITAL OBSERVATION PER HR: HCPCS

## 2025-03-08 PROCEDURE — 96375 TX/PRO/DX INJ NEW DRUG ADDON: CPT

## 2025-03-08 PROCEDURE — 25010000002 HYDROMORPHONE PER 4 MG: Performed by: NURSE PRACTITIONER

## 2025-03-08 RX ORDER — PROCHLORPERAZINE EDISYLATE 5 MG/ML
10 INJECTION INTRAMUSCULAR; INTRAVENOUS ONCE
Status: COMPLETED | OUTPATIENT
Start: 2025-03-08 | End: 2025-03-08

## 2025-03-08 RX ORDER — ACETAMINOPHEN 325 MG/1
650 TABLET ORAL EVERY 6 HOURS PRN
Status: DISCONTINUED | OUTPATIENT
Start: 2025-03-08 | End: 2025-03-08 | Stop reason: HOSPADM

## 2025-03-08 RX ORDER — GABAPENTIN 400 MG/1
800 CAPSULE ORAL EVERY 8 HOURS SCHEDULED
Status: DISCONTINUED | OUTPATIENT
Start: 2025-03-08 | End: 2025-03-08 | Stop reason: HOSPADM

## 2025-03-08 RX ORDER — DIPHENHYDRAMINE HYDROCHLORIDE 50 MG/ML
25 INJECTION INTRAMUSCULAR; INTRAVENOUS ONCE
Status: COMPLETED | OUTPATIENT
Start: 2025-03-08 | End: 2025-03-08

## 2025-03-08 RX ORDER — HYDROMORPHONE HYDROCHLORIDE 1 MG/ML
0.5 INJECTION, SOLUTION INTRAMUSCULAR; INTRAVENOUS; SUBCUTANEOUS
Status: DISCONTINUED | OUTPATIENT
Start: 2025-03-08 | End: 2025-03-08

## 2025-03-08 RX ORDER — LAMOTRIGINE 25 MG/1
25 TABLET ORAL 2 TIMES DAILY
Status: DISCONTINUED | OUTPATIENT
Start: 2025-03-08 | End: 2025-03-08 | Stop reason: HOSPADM

## 2025-03-08 RX ORDER — HYDROCODONE BITARTRATE AND ACETAMINOPHEN 5; 325 MG/1; MG/1
1 TABLET ORAL EVERY 6 HOURS PRN
Qty: 12 TABLET | Refills: 0 | Status: SHIPPED | OUTPATIENT
Start: 2025-03-08

## 2025-03-08 RX ORDER — METRONIDAZOLE 500 MG/100ML
500 INJECTION, SOLUTION INTRAVENOUS EVERY 8 HOURS
Status: DISCONTINUED | OUTPATIENT
Start: 2025-03-08 | End: 2025-03-08 | Stop reason: HOSPADM

## 2025-03-08 RX ORDER — METOPROLOL SUCCINATE 50 MG/1
50 TABLET, EXTENDED RELEASE ORAL DAILY
Status: DISCONTINUED | OUTPATIENT
Start: 2025-03-08 | End: 2025-03-08 | Stop reason: HOSPADM

## 2025-03-08 RX ADMIN — CEFTRIAXONE 2000 MG: 2 INJECTION, POWDER, FOR SOLUTION INTRAMUSCULAR; INTRAVENOUS at 10:25

## 2025-03-08 RX ADMIN — HYDROMORPHONE HYDROCHLORIDE 0.5 MG: 1 INJECTION, SOLUTION INTRAMUSCULAR; INTRAVENOUS; SUBCUTANEOUS at 06:59

## 2025-03-08 RX ADMIN — GABAPENTIN 800 MG: 400 CAPSULE ORAL at 15:59

## 2025-03-08 RX ADMIN — HYDROMORPHONE HYDROCHLORIDE 0.5 MG: 1 INJECTION, SOLUTION INTRAMUSCULAR; INTRAVENOUS; SUBCUTANEOUS at 03:39

## 2025-03-08 RX ADMIN — METRONIDAZOLE 500 MG: 500 INJECTION, SOLUTION INTRAVENOUS at 10:25

## 2025-03-08 RX ADMIN — LAMOTRIGINE 25 MG: 25 TABLET ORAL at 09:29

## 2025-03-08 RX ADMIN — PROCHLORPERAZINE EDISYLATE 10 MG: 5 INJECTION, SOLUTION INTRAMUSCULAR; INTRAVENOUS at 10:24

## 2025-03-08 RX ADMIN — FLUOXETINE 40 MG: 20 CAPSULE ORAL at 09:29

## 2025-03-08 RX ADMIN — METOPROLOL SUCCINATE 50 MG: 50 TABLET, EXTENDED RELEASE ORAL at 09:28

## 2025-03-08 RX ADMIN — MORPHINE SULFATE 4 MG: 4 INJECTION, SOLUTION INTRAMUSCULAR; INTRAVENOUS at 16:00

## 2025-03-08 RX ADMIN — DIPHENHYDRAMINE HYDROCHLORIDE 25 MG: 50 INJECTION, SOLUTION INTRAMUSCULAR; INTRAVENOUS at 10:24

## 2025-03-08 RX ADMIN — ACETAMINOPHEN 650 MG: 325 TABLET, FILM COATED ORAL at 09:29

## 2025-03-08 RX ADMIN — GABAPENTIN 800 MG: 400 CAPSULE ORAL at 09:28

## 2025-03-08 NOTE — DISCHARGE SUMMARY
"[facility]    Trinidad Pickett REJI    CHIEF COMPLAINT:     Abdominal pain    HISTORY OF PRESENT ILLNESS:    Obtained from admitting physician HPI on 3/7/2025:  Patient is a 49-year-old female who had an appendectomy with Dr. Mcneal 5 days ago on Monday and states that she felt better on Tuesday and then on Wednesday she began to feel bad again and now her pain is so severe in the left lower quadrant which radiates into her back that she is unable to sit up straight she cannot cough or dress or wipe herself after seeing the restroom.  She does appear in acute pain upon my exam.  She denies any fever or chills    25:  Patient confirms the HPI noted above reporting that she initially did well following her procedure however for the past several days she has had increasing left lower quadrant pain which has become severe with significant tenderness noted.  No fever, dyspnea or changes in bowel or bladder habits are noted            Past Medical History:   Diagnosis Date    Abnormal ECG 2022    Acetaminophen abuse     Takes 3 Excedrin Migraine daily    Congenital heart disease 2022    Congestive heart failure 2022    Heart murmur     It was obly noticed when i was pregnant    Hypertension     IBS (irritable bowel syndrome)     Migraines      Past Surgical History:   Procedure Laterality Date    ABDOMINAL SURGERY      Patient states that after last  section, she was told that her organs had fused together and the surgeon had to \"rebuild \"her abdomen.    APPENDECTOMY N/A 3/3/2025    Procedure: APPENDECTOMY LAPAROSCOPIC;  Surgeon: Pablo Mcneal MD;  Location: [facility];  Service: General;  Laterality: N/A;     SECTION      CHOLECYSTECTOMY      HYSTERECTOMY      TUMOR REMOVAL Left     Fatty tumor left lower abdomen     Family History   Problem Relation Age of Onset    Cancer Mother     Anemia Mother     Arrhythmia Mother     Clotting disorder " Mother     Fainting Mother     Heart attack Mother     Heart disease Mother     Heart failure Mother     Heart disease Maternal Grandfather         Harley had heart disease and a stroke    Heart attack Maternal Grandfather     Heart attack Brother     Heart disease Brother     Heart failure Brother     Hypertension Brother      Social History     Tobacco Use    Smoking status: Some Days     Current packs/day: 0.00     Average packs/day: 0.5 packs/day for 22.0 years (11.0 ttl pk-yrs)     Types: Cigarettes, Electronic Cigarette     Start date: 10/10/1995     Last attempt to quit: 8/15/2017     Years since quittin.5    Smokeless tobacco: Never    Tobacco comments:     Stopped smoking cigarettes. Picked up vaping shortly after   Vaping Use    Vaping status: Some Days    Substances: Nicotine    Devices: Mettl tank   Substance Use Topics    Alcohol use: Not Currently     Comment: Occasionally. Holidays or nights out with family or friends    Drug use: Never     Medications Prior to Admission   Medication Sig Dispense Refill Last Dose/Taking    FLUoxetine (PROzac) 40 MG capsule Take 1 capsule by mouth Daily.   3/6/2025 Morning    gabapentin (NEURONTIN) 800 MG tablet Take 1 tablet by mouth 3 (Three) Times a Day.   3/6/2025 Evening    lamoTRIgine (LaMICtal) 25 MG tablet Take 1 tablet by mouth 2 (Two) Times a Day.   3/6/2025 Morning    metoprolol succinate XL (TOPROL-XL) 50 MG 24 hr tablet Take 1 tablet by mouth Daily.   3/6/2025 Evening     Allergies:  Penicillins and Dilaudid [hydromorphone hcl]    Immunization History   Administered Date(s) Administered    COVID-19 (PFIZER) Purple Cap Monovalent 2021, 2021           REVIEW OF SYSTEMS:    Review of Systems   Constitutional: Negative.   HENT: Negative.     Eyes: Negative.    Cardiovascular: Negative.    Respiratory: Negative.     Skin: Negative.    Musculoskeletal: Negative.    Gastrointestinal:  Positive for abdominal pain.   Genitourinary: Negative.     Neurological: Negative.    Psychiatric/Behavioral: Negative.       Vital Signs  Temp:  [97.8 °F (36.6 °C)-98.2 °F (36.8 °C)] 97.8 °F (36.6 °C)  Heart Rate:  [] 84  Resp:  [15-20] 16  BP: ()/() 120/71          Physical Exam:  Physical Exam  Vitals reviewed.   Constitutional:       General: She is not in acute distress.     Appearance: Normal appearance. She is not ill-appearing, toxic-appearing or diaphoretic.   HENT:      Head: Normocephalic.      Right Ear: External ear normal.      Left Ear: External ear normal.      Nose: Nose normal.      Mouth/Throat:      Mouth: Mucous membranes are moist.   Eyes:      Extraocular Movements: Extraocular movements intact.   Cardiovascular:      Rate and Rhythm: Normal rate and regular rhythm.      Pulses: Normal pulses.   Pulmonary:      Effort: Pulmonary effort is normal.      Breath sounds: Normal breath sounds.   Abdominal:      General: Bowel sounds are normal.      Palpations: Abdomen is soft.      Tenderness: There is abdominal tenderness.   Musculoskeletal:         General: Normal range of motion.      Cervical back: Normal range of motion.      Right lower leg: No edema.      Left lower leg: No edema.   Skin:     General: Skin is warm and dry.      Capillary Refill: Capillary refill takes less than 2 seconds.   Neurological:      General: No focal deficit present.      Mental Status: She is alert.   Psychiatric:         Mood and Affect: Mood normal.         Behavior: Behavior normal.         Thought Content: Thought content normal.         Judgment: Judgment normal.         Emotional Behavior:   Normal   Debilities:  None  Results Review:    I reviewed the patient's new clinical results.  Lab Results (most recent)       Procedure Component Value Units Date/Time    Basic Metabolic Panel [[id number]]  (Normal) Collected: 03/08/25 0003    Specimen: Blood from Arm, Right Updated: 03/08/25 0241     Glucose 89 mg/dL      BUN 7 mg/dL      Creatinine 0.67  mg/dL      Sodium 140 mmol/L      Potassium 4.0 mmol/L      Chloride 102 mmol/L      CO2 27.7 mmol/L      Calcium 9.4 mg/dL      BUN/Creatinine Ratio 10.4     Anion Gap 10.3 mmol/L      eGFR 106.6 mL/min/1.73     Narrative:      GFR Categories in Chronic Kidney Disease (CKD)      GFR Category          GFR (mL/min/1.73)    Interpretation  G1                     90 or greater         Normal or high (1)  G2                      60-89                Mild decrease (1)  G3a                   45-59                Mild to moderate decrease  G3b                   30-44                Moderate to severe decrease  G4                    15-29                Severe decrease  G5                    14 or less           Kidney failure          (1)In the absence of evidence of kidney disease, neither GFR category G1 or G2 fulfill the criteria for CKD.    eGFR calculation 2021 CKD-EPI creatinine equation, which does not include race as a factor    CBC Auto Differential [[id number]]  (Abnormal) Collected: 03/08/25 0003    Specimen: Blood from Arm, Right Updated: 03/08/25 0221     WBC 9.38 10*3/mm3      RBC 3.46 10*6/mm3      Hemoglobin 10.1 g/dL      Hematocrit 32.7 %      MCV 94.5 fL      MCH 29.2 pg      MCHC 30.9 g/dL      RDW 13.1 %      RDW-SD 45.4 fl      MPV 8.7 fL      Platelets 487 10*3/mm3      Neutrophil % 57.4 %      Lymphocyte % 24.6 %      Monocyte % 11.7 %      Eosinophil % 5.3 %      Basophil % 0.7 %      Immature Grans % 0.3 %      Neutrophils, Absolute 5.37 10*3/mm3      Lymphocytes, Absolute 2.31 10*3/mm3      Monocytes, Absolute 1.10 10*3/mm3      Eosinophils, Absolute 0.50 10*3/mm3      Basophils, Absolute 0.07 10*3/mm3      Immature Grans, Absolute 0.03 10*3/mm3      nRBC 0.0 /100 WBC     Urinalysis With Culture If Indicated - Straight Cath [[id number]]  (Normal) Collected: 03/07/25 1844    Specimen: Urine from Straight Cath Updated: 03/07/25 1900     Color, UA Yellow     Appearance, UA Clear     pH, UA 7.0      Specific Gravity, UA 1.008     Glucose, UA Negative     Ketones, UA Negative     Bilirubin, UA Negative     Blood, UA Negative     Protein, UA Negative     Leuk Esterase, UA Negative     Nitrite, UA Negative     Urobilinogen, UA 0.2 E.U./dL    Narrative:      In absence of clinical symptoms, the presence of pyuria, bacteria, and/or nitrites on the urinalysis result does not correlate with infection.  Urine microscopic not indicated.    Blood Culture - Blood, Arm, Left [[id number]] Collected: 03/07/25 1851    Specimen: Blood from Arm, Left Updated: 03/07/25 1855    Lactic Acid, Plasma [[id number]]  (Normal) Collected: 03/07/25 1752    Specimen: Blood Updated: 03/07/25 1845     Lactate 1.1 mmol/L     Comprehensive Metabolic Panel [[id number]]  (Abnormal) Collected: 03/07/25 1752    Specimen: Blood Updated: 03/07/25 1841     Glucose 86 mg/dL      BUN 9 mg/dL      Creatinine 0.69 mg/dL      Sodium 139 mmol/L      Potassium 3.8 mmol/L      Chloride 102 mmol/L      CO2 25.7 mmol/L      Calcium 9.4 mg/dL      Total Protein 6.5 g/dL      Albumin 3.7 g/dL      ALT (SGPT) 80 U/L      AST (SGOT) 34 U/L      Alkaline Phosphatase 155 U/L      Total Bilirubin <0.2 mg/dL      Globulin 2.8 gm/dL      A/G Ratio 1.3 g/dL      BUN/Creatinine Ratio 13.0     Anion Gap 11.3 mmol/L      eGFR 105.9 mL/min/1.73     Narrative:      GFR Categories in Chronic Kidney Disease (CKD)      GFR Category          GFR (mL/min/1.73)    Interpretation  G1                     90 or greater         Normal or high (1)  G2                      60-89                Mild decrease (1)  G3a                   45-59                Mild to moderate decrease  G3b                   30-44                Moderate to severe decrease  G4                    15-29                Severe decrease  G5                    14 or less           Kidney failure          (1)In the absence of evidence of kidney disease, neither GFR category G1 or G2 fulfill the criteria for  CKD.    eGFR calculation 2021 CKD-EPI creatinine equation, which does not include race as a factor    Lipase [[id number]]  (Normal) Collected: 03/07/25 1752    Specimen: Blood Updated: 03/07/25 1841     Lipase 33 U/L     CBC & Differential [[id number]]  (Abnormal) Collected: 03/07/25 1752    Specimen: Blood Updated: 03/07/25 1808    Narrative:      The following orders were created for panel order CBC & Differential.  Procedure                               Abnormality         Status                     ---------                               -----------         ------                     CBC Auto Differential[[id number]]        Abnormal            Final result                 Please view results for these tests on the individual orders.    CBC Auto Differential [[id number]]  (Abnormal) Collected: 03/07/25 1752    Specimen: Blood Updated: 03/07/25 1808     WBC 10.08 10*3/mm3      RBC 3.69 10*6/mm3      Hemoglobin 10.8 g/dL      Hematocrit 35.1 %      MCV 95.1 fL      MCH 29.3 pg      MCHC 30.8 g/dL      RDW 12.9 %      RDW-SD 45.5 fl      MPV 8.5 fL      Platelets 506 10*3/mm3      Neutrophil % 62.3 %      Lymphocyte % 21.0 %      Monocyte % 10.8 %      Eosinophil % 4.7 %      Basophil % 0.9 %      Immature Grans % 0.3 %      Neutrophils, Absolute 6.28 10*3/mm3      Lymphocytes, Absolute 2.12 10*3/mm3      Monocytes, Absolute 1.09 10*3/mm3      Eosinophils, Absolute 0.47 10*3/mm3      Basophils, Absolute 0.09 10*3/mm3      Immature Grans, Absolute 0.03 10*3/mm3      nRBC 0.0 /100 WBC     Blood Culture - Blood, Arm, Right [[id number]] Collected: 03/07/25 1752    Specimen: Blood from Arm, Right Updated: 03/07/25 1805            Imaging Results (Most Recent)       Procedure Component Value Units Date/Time    CT Abdomen Pelvis With Contrast [[id number]] Collected: 03/07/25 2106     Updated: 03/07/25 2116    Narrative:      CT ABDOMEN PELVIS W CONTRAST    Date of Exam: 3/7/2025 7:55 PM EST    Indication: LLQ pain after  appendectomy on Monday.    Comparison: 3/3/2025    Technique: Axial CT images were obtained of the abdomen and pelvis following the uneventful intravenous administration of iodinated contrast. Sagittal and coronal reconstructions were performed.  Automated exposure control and iterative reconstruction   methods were used.    FINDINGS:    Lung bases: No masses. No consolidation.    Liver:No masses. No intrahepatic biliary ductal dilatation.    Spleen:No masses. No perisplenic hematoma.    Pancreas:No pancreatic masses. No evidence of pancreatitis.    Gallbladder and common bile duct: Previous cholecystectomy    Adrenal glands:No adrenal masses    Kidneys and ureters:No kidney stones. No renal masses.No calculi present within the ureters. Normal caliber ureters.    Urinary bladder:No urinary bladder wall thickening. No bladder masses.    Small bowel:Normal caliber small bowel.    Large bowel: Colonic diverticulosis without evidence of diverticulitis.    Appendix: Recent appendectomy. Residual inflammation noted in the right lower quadrant. There is a new small collection noted in the right pelvis seen on image #123 series 5 measuring 2.6 cm x 2.1 cm. Questionable peripheral enhancement. This may   represent a small abscess or postop seroma.    GENITOURINARY: Prior hysterectomy    Ascites or pneumoperitoneum:None.    Adenopathy:None present    Osseous structures: The proximal femurs are intact. The pubic bones are intact. The sacrum and sacroiliac joints are normal. No rib fractures.    Other findings: None      Impression:      Postsurgical changes. Small collection to the right of the urinary bladder with some peripheral enhancement favored to represent a small postoperative seroma. Residual inflammation noted within the region. Underlying infectious component   cannot be excluded. Colonic diverticulosis without evidence of diverticulitis.            Electronically Signed: Neal Batista MD    3/7/2025 9:14 PM EST     Workstation ID: [id number]          reviewed    ECG/EMG Results (most recent)       None          not reviewed        Results for orders placed in visit on 12/21/22    Adult Transthoracic Echo Complete W/ Cont if Necessary Per Protocol    Interpretation Summary    Left ventricular systolic function is normal. Calculated left ventricular EF = 54% Left ventricular ejection fraction appears to be 51 - 55%.    Left ventricular diastolic function was normal.    Estimated right ventricular systolic pressure from tricuspid regurgitation is normal (<35 mmHg).    No significant valvular abnormalities.      Microbiology Results (last 10 days)       Procedure Component Value - Date/Time    Blood Culture - Blood, Arm, Right [[id number]]  (Normal) Collected: 03/03/25 1518    Lab Status: Final result Specimen: Blood from Arm, Right Updated: 03/08/25 1531     Blood Culture No growth at 5 days    Blood Culture - Blood, Arm, Left [[id number]]  (Normal) Collected: 03/03/25 1515    Lab Status: Final result Specimen: Blood from Arm, Left Updated: 03/08/25 1531     Blood Culture No growth at 5 days            Assessment & Plan     Abdominal pain       Abdominal pain with recent appendectomy  Lab Results   Component Value Date    WBC 9.38 03/08/2025    AST 34 (H) 03/07/2025    ALT 80 (H) 03/07/2025    ALKPHOS 155 (H) 03/07/2025    BILITOT <0.2 03/07/2025    LIPASE 33 03/07/2025   -UA unremarkable  -Blood cultures pending  -CT of abdomen and pelvis: Postsurgical changes with a small fluid collection noted to the right of the urinary bladder with some peripheral enhancement favored to represent small postoperative seroma with residual inflammation in the area though radiologist does note that underlying infectious component is not excluded.  Colonic diverticulosis without diverticulitis is present  -In the ED patient given IV Dilaudid, Zofran and Inapsine  -General Surgery consult  -N.p.o. diet    Hypertension  -Will controlled   BP Readings  from Last 1 Encounters:   03/08/25 120/71   - Continue metoprolol  - Monitor while admitted    Anxiety/depression  -Fluoxetine    I discussed the patients findings and my recommendations with patient and nursing staff.     Discharge Diagnosis:      Abdominal pain      Hospital Course  Patient is a 49 y.o. female presented with abdominal pain following recent appendectomy with an HPI noted above.  Patient does not have mild increase in ALT and AST at 80, 34 respectively with an alk phos of 155.  Hemoglobin was initially found to be 10.8 with remainder of CBC and CMP being generally unremarkable.  UA was obtained with no abnormal findings and blood cultures were also obtained in the ED.  CT of abdomen and pelvis showed postsurgical changes with a small fluid collection present to the right of the urinary bladder with some peripheral enhancement favored by radiologist to represent small postoperative seroma with residual inflammatory changes though infectious etiology was not excluded.  She was given Dilaudid, Zofran and Inapsine in the ED.  General surgeon was consulted evaluated patient recommending no further inpatient treatment and noting she is okay for discharge and outpatient follow-up which patient is agreeable to.  At this time patient is felt to be in good condition for discharge with close follow-up with her PCP as well as general surgery on an outpatient basis.  Her full testing/results and plan were discussed with patient along with concerning/alarm symptoms for which to call 911/return to the ED.  All questions were answered and she verbalizes her understanding and agreement.    Past Medical History:     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    Congestive heart failure 12/20/2022    Heart murmur 2002    It was obly noticed when i was pregnant    Hypertension     IBS (irritable  "bowel syndrome)     Migraines        Past Surgical History:     Past Surgical History:   Procedure Laterality Date    ABDOMINAL SURGERY      Patient states that after last  section, she was told that her organs had fused together and the surgeon had to \"rebuild \"her abdomen.    APPENDECTOMY N/A 3/3/2025    Procedure: APPENDECTOMY LAPAROSCOPIC;  Surgeon: Pablo Mcneal MD;  Location: [facility] MAIN OR;  Service: General;  Laterality: N/A;     SECTION      CHOLECYSTECTOMY      HYSTERECTOMY      TUMOR REMOVAL Left     Fatty tumor left lower abdomen       Social History:   Social History     Socioeconomic History    Marital status:    Tobacco Use    Smoking status: Some Days     Current packs/day: 0.00     Average packs/day: 0.5 packs/day for 22.0 years (11.0 ttl pk-yrs)     Types: Cigarettes, Electronic Cigarette     Start date: 10/10/1995     Last attempt to quit: 8/15/2017     Years since quittin.5    Smokeless tobacco: Never    Tobacco comments:     Stopped smoking cigarettes. Picked up vaping shortly after   Vaping Use    Vaping status: Some Days    Substances: Nicotine    Devices: Refillable tank   Substance and Sexual Activity    Alcohol use: Not Currently     Comment: Occasionally. Holidays or nights out with family or friends    Drug use: Never    Sexual activity: Yes     Partners: Male     Birth control/protection: Hysterectomy       Procedures Performed         Consults:   Consults       Date and Time Order Name Status Description    3/7/2025 10:26 PM IP Consult to General Surgery      3/7/2025  9:26 PM Surgery (on-call MD unless specified)              Condition on Discharge:     Stable    Discharge Disposition  Home or Self Care    Discharge Medications     Discharge Medications        Continue These Medications        Instructions Start Date   FLUoxetine 40 MG capsule  Commonly known as: PROzac   40 mg, Daily      gabapentin 800 MG tablet  Commonly known as: NEURONTIN   800 mg, 3 " Times Daily      lamoTRIgine 25 MG tablet  Commonly known as: LaMICtal   25 mg, 2 Times Daily      metoprolol succinate XL 50 MG 24 hr tablet  Commonly known as: TOPROL-XL   50 mg, Daily               Discharge Diet:     Activity at Discharge:     Follow-up Appointments  No future appointments.  Additional Instructions for the Follow-ups that You Need to Schedule       Discharge Follow-up with PCP   As directed       Currently Documented PCP:    Trinidad Pickett APRN    PCP Phone Number:    [phone]     Follow Up Details: 5 to 7 days        Discharge Follow-up with Specified Provider: General surgery   As directed      To: General surgery                Test Results Pending at Discharge  Pending Results       Procedure [Order ID] Specimen - Date/Time    Blood Culture - Blood, Arm, Left [[id number]] Collected: 03/07/25 1851    Specimen: Blood from Arm, Left Updated: 03/07/25 1855    Blood Culture - Blood, Arm, Right [[id number]] Collected: 03/07/25 1752    Specimen: Blood from Arm, Right Updated: 03/07/25 1805             Risk for Readmission (LACE) Score: 5 (3/8/2025  6:00 AM)      Greater than 30 minutes spent in discharge activities for this patient    Signature:Electronically signed by Harlan Britt PA-C, 03/08/25, 4:20 PM EST.

## 2025-03-08 NOTE — H&P
Formerly [facility] H&P    Patient Name: Mary Gentile  : 1975  MRN: [id number]  Primary Care Physician: Trinidad Pickett APRN  Date of admission: 3/7/2025     Patient Care Team:  Trinidad Pickett APRN as PCP - General (Nurse Practitioner)  Ronn Matthews MD as Cardiologist (Cardiology)          Subjective   History Present Illness     Chief Complaint:   Chief Complaint   Patient presents with    Post-op Problem     Pt had appendectomy on Monday, Pt now has left side abd pain States she can't straighten up, she can't cough without screaming and she can't reach around to wipe herself after using the restroom.  Pt called Dr. Mcneal and was told to come to the ER.               History of Present Illness  Obtained from admitting physician HPI on 3/7/2025:  Patient is a 49-year-old female who had an appendectomy with Dr. Mcneal 5 days ago on Monday and states that she felt better on Tuesday and then on Wednesday she began to feel bad again and now her pain is so severe in the left lower quadrant which radiates into her back that she is unable to sit up straight she cannot cough or dress or wipe herself after seeing the restroom.  She does appear in acute pain upon my exam.  She denies any fever or chills     25:  Patient confirms the HPI noted above reporting that she initially did well following her procedure however for the past several days she has had increasing left lower quadrant pain which has become severe with significant tenderness noted.  No fever, dyspnea or changes in bowel or bladder habits are noted        ROS  Review of Systems   Constitutional: Negative.   HENT: Negative.     Eyes: Negative.    Cardiovascular: Negative.    Respiratory: Negative.     Skin: Negative.    Musculoskeletal: Negative.    Gastrointestinal:  Positive for abdominal pain.   Genitourinary: Negative.    Neurological: Negative.    Psychiatric/Behavioral: Negative.           Personal History     Past  "Medical History:   Past Medical History:   Diagnosis Date    Abnormal ECG 2022    Acetaminophen abuse     Takes 3 Excedrin Migraine daily    Congenital heart disease 2022    Congestive heart failure 2022    Heart murmur     It was obly noticed when i was pregnant    Hypertension     IBS (irritable bowel syndrome)     Migraines        Surgical History:      Past Surgical History:   Procedure Laterality Date    ABDOMINAL SURGERY      Patient states that after last  section, she was told that her organs had fused together and the surgeon had to \"rebuild \"her abdomen.    APPENDECTOMY N/A 3/3/2025    Procedure: APPENDECTOMY LAPAROSCOPIC;  Surgeon: Pablo Mcneal MD;  Location: [facility] MAIN OR;  Service: General;  Laterality: N/A;     SECTION      CHOLECYSTECTOMY      HYSTERECTOMY      TUMOR REMOVAL Left     Fatty tumor left lower abdomen           Family History: family history includes Anemia in her mother; Arrhythmia in her mother; Cancer in her mother; Clotting disorder in her mother; Fainting in her mother; Heart attack in her brother, maternal grandfather, and mother; Heart disease in her brother, maternal grandfather, and mother; Heart failure in her brother and mother; Hypertension in her brother. Otherwise pertinent FHx was reviewed and unremarkable.     Social History:  reports that she has been smoking cigarettes and electronic cigarette. She started smoking about 29 years ago. She has a 11 pack-year smoking history. She has never used smokeless tobacco. She reports that she does not currently use alcohol. She reports that she does not use drugs.      Medications:  Prior to Admission medications    Medication Sig Start Date End Date Taking? Authorizing Provider   FLUoxetine (PROzac) 40 MG capsule Take 1 capsule by mouth Daily.   Yes Padmini Bone MD   gabapentin (NEURONTIN) 800 MG tablet Take 1 tablet by mouth 3 (Three) Times a Day.   Yes ProviderPadmini MD "   lamoTRIgine (LaMICtal) 25 MG tablet Take 1 tablet by mouth 2 (Two) Times a Day.   Yes Provider, MD Padmini   metoprolol succinate XL (TOPROL-XL) 50 MG 24 hr tablet Take 1 tablet by mouth Daily.   Yes Provider, MD Padmini       Allergies:    Allergies   Allergen Reactions    Penicillins Swelling and Unknown - High Severity     Beta lactam allergy details  Antibiotic reaction: unknown  Age at reaction: unknown  Dose to reaction time: unknown  Reason for antibiotic: unknown  Epinephrine required for reaction?: unknown  Tolerated antibiotics: unknown       Dilaudid [Hydromorphone Hcl] Headache       Objective   Objective     Vital Signs  Temp:  [97.8 °F (36.6 °C)-98.2 °F (36.8 °C)] 97.8 °F (36.6 °C)  Heart Rate:  [] 84  Resp:  [15-20] 16  BP: ()/() 120/71  SpO2:  [86 %-99 %] 94 %  on  Flow (L/min) (Oxygen Therapy):  [1] 1;   Device (Oxygen Therapy): room air  Body mass index is 35.26 kg/m².    Physical Exam  Vitals reviewed.   Constitutional:       General: She is not in acute distress.     Appearance: Normal appearance. She is not ill-appearing, toxic-appearing or diaphoretic.   HENT:      Head: Normocephalic.      Right Ear: External ear normal.      Left Ear: External ear normal.      Nose: Nose normal.      Mouth/Throat:      Mouth: Mucous membranes are moist.   Eyes:      Extraocular Movements: Extraocular movements intact.   Cardiovascular:      Rate and Rhythm: Normal rate and regular rhythm.      Pulses: Normal pulses.   Pulmonary:      Effort: Pulmonary effort is normal.      Breath sounds: Normal breath sounds.   Abdominal:      General: Bowel sounds are normal.      Palpations: Abdomen is soft.      Tenderness: There is abdominal tenderness.   Musculoskeletal:         General: Normal range of motion.      Cervical back: Normal range of motion.      Right lower leg: No edema.      Left lower leg: No edema.   Skin:     General: Skin is warm and dry.      Capillary Refill: Capillary  refill takes less than 2 seconds.   Neurological:      General: No focal deficit present.      Mental Status: She is alert.   Psychiatric:         Mood and Affect: Mood normal.         Behavior: Behavior normal.         Thought Content: Thought content normal.         Judgment: Judgment normal.     Results Review:  I have personally reviewed most recent lab results and radiology images and interpretations and agree with findings, most notably: CMP, CBC, UA and CT of abdomen and pelvis.    Results from last 7 days   Lab Units 03/08/25  0003   WBC 10*3/mm3 9.38   HEMOGLOBIN g/dL 10.1*   HEMATOCRIT % 32.7*   PLATELETS 10*3/mm3 487*     Results from last 7 days   Lab Units 03/08/25  0003 03/07/25  1752   SODIUM mmol/L 140 139   POTASSIUM mmol/L 4.0 3.8   CHLORIDE mmol/L 102 102   CO2 mmol/L 27.7 25.7   BUN mg/dL 7 9   CREATININE mg/dL 0.67 0.69   GLUCOSE mg/dL 89 86   CALCIUM mg/dL 9.4 9.4   ALK PHOS U/L  --  155*   ALT (SGPT) U/L  --  80*   AST (SGOT) U/L  --  34*   LACTATE mmol/L  --  1.1     Estimated Creatinine Clearance: 95.3 mL/min (by C-G formula based on SCr of 0.67 mg/dL).  Brief Urine Lab Results  (Last result in the past 365 days)        Color   Clarity   Blood   Leuk Est   Nitrite   Protein   CREAT   Urine HCG        03/07/25 1844 Yellow   Clear   Negative   Negative   Negative   Negative                   Microbiology Results (last 10 days)       Procedure Component Value - Date/Time    Blood Culture - Blood, Arm, Right [[id number]]  (Normal) Collected: 03/03/25 1518    Lab Status: Preliminary result Specimen: Blood from Arm, Right Updated: 03/07/25 1530     Blood Culture No growth at 4 days    Blood Culture - Blood, Arm, Left [[id number]]  (Normal) Collected: 03/03/25 1515    Lab Status: Preliminary result Specimen: Blood from Arm, Left Updated: 03/07/25 1530     Blood Culture No growth at 4 days            ECG/EMG Results (most recent)       None                Results for orders placed in visit on  12/21/22    Adult Transthoracic Echo Complete W/ Cont if Necessary Per Protocol    Interpretation Summary    Left ventricular systolic function is normal. Calculated left ventricular EF = 54% Left ventricular ejection fraction appears to be 51 - 55%.    Left ventricular diastolic function was normal.    Estimated right ventricular systolic pressure from tricuspid regurgitation is normal (<35 mmHg).    No significant valvular abnormalities.      CT Abdomen Pelvis With Contrast  Result Date: 3/7/2025  Postsurgical changes. Small collection to the right of the urinary bladder with some peripheral enhancement favored to represent a small postoperative seroma. Residual inflammation noted within the region. Underlying infectious component cannot be excluded. Colonic diverticulosis without evidence of diverticulitis. Electronically Signed: Neal Batista MD  3/7/2025 9:14 PM EST  Workstation ID: [id number]    CT Abdomen Pelvis With Contrast  Result Date: 3/3/2025  1.Acute appendicitis. No evidence of perforation. 2.Colonic diverticulosis without evidence of diverticulitis. 3.Additional chronic findings as described above. 4.This critical finding was discussed with SAPPHIRE Qiu at 3:55 p.m. on 3/3/2025 Electronically Signed: Neal Batista MD  3/3/2025 3:55 PM EST  Workstation ID: [id number]        Estimated Creatinine Clearance: 95.3 mL/min (by C-G formula based on SCr of 0.67 mg/dL).    Assessment & Plan   Assessment/Plan       Active Hospital Problems    Diagnosis  POA    **Abdominal pain [R10.9]  Yes      Resolved Hospital Problems   No resolved problems to display.     Abdominal pain with recent appendectomy        Lab Results   Component Value Date     WBC 9.38 03/08/2025     AST 34 (H) 03/07/2025     ALT 80 (H) 03/07/2025     ALKPHOS 155 (H) 03/07/2025     BILITOT <0.2 03/07/2025     LIPASE 33 03/07/2025   -UA unremarkable  -Blood cultures pending  -CT of abdomen and pelvis: Postsurgical changes with a small fluid collection  noted to the right of the urinary bladder with some peripheral enhancement favored to represent small postoperative seroma with residual inflammation in the area though radiologist does note that underlying infectious component is not excluded.  Colonic diverticulosis without diverticulitis is present  -In the ED patient given IV Dilaudid, Zofran and Inapsine  -General Surgery consult  -N.p.o. diet     Hypertension  -Will controlled       BP Readings from Last 1 Encounters:   03/08/25 119/67   - Continue metoprolol  - Monitor while admitted     Anxiety/depression  -Fluoxetine            VTE Prophylaxis - Active VTE Prophylaxis:  Pharmacologic:        Start     Dose Route Frequency Stop    03/07/25 2345  enoxaparin sodium (LOVENOX) syringe 40 mg         40 mg SC Daily --    03/07/25 2201  Pharmacy to Dose enoxaparin (LOVENOX)        Question:  Indication of use  Answer:  VTE Prophylaxis    -- XX Continuous PRN --                  Select Mechanical VTE Prophylaxis if Desired & Appropriate      CODE STATUS:    Code Status and Medical Interventions: CPR (Attempt to Resuscitate); Full Support   Ordered at: 03/07/25 2202     Code Status (Patient has no pulse and is not breathing):    CPR (Attempt to Resuscitate)     Medical Interventions (Patient has pulse or is breathing):    Full Support     Level Of Support Discussed With:    Patient       This patient has been examined wearing personal protective equipment.     I discussed the patient's findings and my recommendations with patient and nursing staff.      Signature:Electronically signed by Harlan Britt PA-C, 03/08/25, 2:57 PM EST.

## 2025-03-08 NOTE — PLAN OF CARE
Problem: Adult Inpatient Plan of Care  Goal: Plan of Care Review  3/8/2025 0520 by Emmanuelle Tobin RN  Outcome: Progressing  Flowsheets (Taken 3/8/2025 0520)  Progress: improving  Plan of Care Reviewed With: patient  3/7/2025 2315 by Emmanuelle Tobin RN  Outcome: Progressing  Flowsheets (Taken 3/7/2025 2315)  Plan of Care Reviewed With: patient  Goal: Patient-Specific Goal (Individualized)  3/8/2025 0520 by Emmanuelle Tobin RN  Outcome: Progressing  3/7/2025 2315 by Emmanuelle Tobin RN  Outcome: Progressing  Goal: Absence of Hospital-Acquired Illness or Injury  3/8/2025 0520 by Emmanuelle Tobin RN  Outcome: Progressing  3/7/2025 2315 by Emmanuelle Tobin RN  Outcome: Progressing  Intervention: Identify and Manage Fall Risk  Recent Flowsheet Documentation  Taken 3/8/2025 0500 by Emmanuelle Tobin RN  Safety Promotion/Fall Prevention:   activity supervised   clutter free environment maintained   fall prevention program maintained   lighting adjusted   muscle strengthening facilitated   nonskid shoes/slippers when out of bed   room organization consistent   safety round/check completed  Taken 3/8/2025 0400 by Emmanuelle Tobin RN  Safety Promotion/Fall Prevention:   activity supervised   clutter free environment maintained   fall prevention program maintained   lighting adjusted   muscle strengthening facilitated   nonskid shoes/slippers when out of bed   safety round/check completed  Taken 3/8/2025 0300 by Emmanuelle Tobin RN  Safety Promotion/Fall Prevention:   activity supervised   clutter free environment maintained   fall prevention program maintained   lighting adjusted   nonskid shoes/slippers when out of bed   room organization consistent   safety round/check completed  Taken 3/8/2025 0200 by Emmanuelle Tobin RN  Safety Promotion/Fall Prevention:   activity supervised   clutter free environment maintained   fall prevention program maintained   lighting adjusted   nonskid shoes/slippers when out of  bed   room organization consistent   safety round/check completed  Taken 3/8/2025 0100 by Emmanuelle Tobin RN  Safety Promotion/Fall Prevention:   activity supervised   clutter free environment maintained   fall prevention program maintained   lighting adjusted   nonskid shoes/slippers when out of bed   room organization consistent   safety round/check completed  Taken 3/8/2025 0000 by Emmanuelle Tobin RN  Safety Promotion/Fall Prevention:   activity supervised   clutter free environment maintained   fall prevention program maintained   lighting adjusted   nonskid shoes/slippers when out of bed   room organization consistent   safety round/check completed  Intervention: Prevent Skin Injury  Recent Flowsheet Documentation  Taken 3/8/2025 0500 by Emmanuelle Tobin RN  Body Position: position changed independently  Taken 3/8/2025 0400 by Emmanuelle Tobin RN  Body Position: position changed independently  Taken 3/8/2025 0300 by Emmanuelle Tobin RN  Body Position: position changed independently  Taken 3/8/2025 0200 by Emmanuelle Tobin RN  Body Position: position changed independently  Taken 3/8/2025 0100 by Emmanuelle Tobin RN  Body Position: position changed independently  Taken 3/8/2025 0000 by Emmanuelle Tobin RN  Body Position: position changed independently  Intervention: Prevent Infection  Recent Flowsheet Documentation  Taken 3/8/2025 0500 by Emmanuelle Tobin RN  Infection Prevention:   rest/sleep promoted   single patient room provided  Taken 3/8/2025 0400 by Emmanuelle oTbin RN  Infection Prevention:   rest/sleep promoted   single patient room provided  Taken 3/8/2025 0300 by Emmanuelle Tobin RN  Infection Prevention:   rest/sleep promoted   single patient room provided  Taken 3/8/2025 0200 by Emmanuelle Tobin RN  Infection Prevention:   rest/sleep promoted   single patient room provided  Taken 3/8/2025 0100 by Emmanuelle Tobin RN  Infection Prevention:   rest/sleep promoted   single patient room  provided  Taken 3/8/2025 0000 by Emmanuelle Tobin RN  Infection Prevention:   rest/sleep promoted   single patient room provided  Goal: Optimal Comfort and Wellbeing  3/8/2025 0520 by Emmanuelle Tobin RN  Outcome: Progressing  3/7/2025 2315 by Emmanuelle Tobin RN  Outcome: Progressing  Intervention: Monitor Pain and Promote Comfort  Recent Flowsheet Documentation  Taken 3/8/2025 0400 by Emmanuelle Tobin RN  Pain Management Interventions: pain medication given  Taken 3/8/2025 0339 by Emmanuelle Tobin RN  Pain Management Interventions: pain medication given  Intervention: Provide Person-Centered Care  Recent Flowsheet Documentation  Taken 3/8/2025 0400 by Emmanuelle Tobin RN  Trust Relationship/Rapport:   care explained   emotional support provided   empathic listening provided   questions encouraged   questions answered  Taken 3/8/2025 0000 by Emmanuelle Tobin RN  Trust Relationship/Rapport:   care explained   empathic listening provided   questions answered   questions encouraged  Goal: Readiness for Transition of Care  3/8/2025 0520 by Emmanuelle Tobin RN  Outcome: Progressing  3/7/2025 2315 by Emmanuelle Tobin RN  Outcome: Progressing  Intervention: Mutually Develop Transition Plan  Recent Flowsheet Documentation  Taken 3/7/2025 2322 by Emmanuelle Tobin RN  Transportation Anticipated: family or friend will provide  Patient/Family Anticipated Services at Transition: none  Patient/Family Anticipates Transition to: home with family  Taken 3/7/2025 2317 by Emmanuelle Tobin RN  Equipment Currently Used at Home: none     Problem: Pain Acute  Goal: Optimal Pain Control and Function  3/8/2025 0520 by Emmanuelle Tobin RN  Outcome: Progressing  3/7/2025 2315 by Emmanuelle Tobin RN  Outcome: Progressing  Intervention: Optimize Psychosocial Wellbeing  Recent Flowsheet Documentation  Taken 3/8/2025 0400 by Emmanuelle Tobin RN  Supportive Measures: active listening utilized  Taken 3/8/2025 0000 by Crista  ROSANNA Handley  Supportive Measures: active listening utilized  Diversional Activities: television  Intervention: Develop Pain Management Plan  Recent Flowsheet Documentation  Taken 3/8/2025 0400 by Emmanuelle Tobin RN  Pain Management Interventions: pain medication given  Taken 3/8/2025 0339 by Emmanuelle Tobin RN  Pain Management Interventions: pain medication given  Intervention: Prevent or Manage Pain  Recent Flowsheet Documentation  Taken 3/8/2025 0500 by Emmanuelle Tobin RN  Medication Review/Management: medications reviewed  Taken 3/8/2025 0400 by Emmanuelle Tobin RN  Medication Review/Management: medications reviewed  Taken 3/8/2025 0300 by Emmanuelle Tobin RN  Medication Review/Management: medications reviewed  Taken 3/8/2025 0200 by Emmanuelle Tobin RN  Medication Review/Management: medications reviewed  Taken 3/8/2025 0100 by Emmanuelle Tobin RN  Medication Review/Management: medications reviewed  Taken 3/8/2025 0000 by Emmanuelle Tobin RN  Bowel Elimination Promotion: privacy promoted  Medication Review/Management: medications reviewed     Problem: Fall Injury Risk  Goal: Absence of Fall and Fall-Related Injury  3/8/2025 0520 by Emmanuelle Tobin RN  Outcome: Progressing  3/7/2025 2315 by Emmanuelle Tobin RN  Outcome: Progressing  Intervention: Identify and Manage Contributors  Recent Flowsheet Documentation  Taken 3/8/2025 0500 by Emmanuelle Tobin RN  Medication Review/Management: medications reviewed  Taken 3/8/2025 0400 by Emmanuelle Tobin RN  Medication Review/Management: medications reviewed  Taken 3/8/2025 0300 by Emmanuelle Tobin RN  Medication Review/Management: medications reviewed  Taken 3/8/2025 0200 by Emmanuelle Tobin RN  Medication Review/Management: medications reviewed  Taken 3/8/2025 0100 by Emmanuelle Tobin RN  Medication Review/Management: medications reviewed  Taken 3/8/2025 0000 by Emmanuelle Tobin RN  Medication Review/Management: medications reviewed  Intervention:  Promote Injury-Free Environment  Recent Flowsheet Documentation  Taken 3/8/2025 0500 by Emmanuelle Tobin RN  Safety Promotion/Fall Prevention:   activity supervised   clutter free environment maintained   fall prevention program maintained   lighting adjusted   muscle strengthening facilitated   nonskid shoes/slippers when out of bed   room organization consistent   safety round/check completed  Taken 3/8/2025 0400 by Emmanuelle Tobin RN  Safety Promotion/Fall Prevention:   activity supervised   clutter free environment maintained   fall prevention program maintained   lighting adjusted   muscle strengthening facilitated   nonskid shoes/slippers when out of bed   safety round/check completed  Taken 3/8/2025 0300 by Emmanuelle Tobin RN  Safety Promotion/Fall Prevention:   activity supervised   clutter free environment maintained   fall prevention program maintained   lighting adjusted   nonskid shoes/slippers when out of bed   room organization consistent   safety round/check completed  Taken 3/8/2025 0200 by Emmanuelle Tobin RN  Safety Promotion/Fall Prevention:   activity supervised   clutter free environment maintained   fall prevention program maintained   lighting adjusted   nonskid shoes/slippers when out of bed   room organization consistent   safety round/check completed  Taken 3/8/2025 0100 by Emmanuelle Tobin RN  Safety Promotion/Fall Prevention:   activity supervised   clutter free environment maintained   fall prevention program maintained   lighting adjusted   nonskid shoes/slippers when out of bed   room organization consistent   safety round/check completed  Taken 3/8/2025 0000 by Emmanuelle Tobin RN  Safety Promotion/Fall Prevention:   activity supervised   clutter free environment maintained   fall prevention program maintained   lighting adjusted   nonskid shoes/slippers when out of bed   room organization consistent   safety round/check completed     Problem: Breathing Pattern  Ineffective  Goal: Effective Breathing Pattern  3/8/2025 0520 by Emmanuelle Tobin RN  Outcome: Progressing  3/7/2025 2315 by Emmanuelle Tobin RN  Outcome: Progressing  Intervention: Promote Improved Breathing Pattern  Recent Flowsheet Documentation  Taken 3/8/2025 0500 by Emmanuelle Tobin RN  Head of Bed (HOB) Positioning: HOB elevated  Taken 3/8/2025 0400 by Emmanuelle Tobin RN  Supportive Measures: active listening utilized  Head of Bed (HOB) Positioning: HOB elevated  Airway/Ventilation Management: oxygen therapy provided  Taken 3/8/2025 0300 by Emmanuelle Tobin RN  Head of Bed (HOB) Positioning: HOB elevated  Taken 3/8/2025 0200 by Emmanuelle Tobin RN  Head of Bed (HOB) Positioning: HOB elevated  Taken 3/8/2025 0100 by Emmanuelle Tobin RN  Head of Bed (HOB) Positioning: HOB elevated  Taken 3/8/2025 0000 by Emmanuelle Tobin RN  Supportive Measures: active listening utilized  Head of Bed (HOB) Positioning: HOB elevated  Airway/Ventilation Management: oxygen therapy provided     Problem: Comorbidity Management  Goal: Maintenance of Asthma Control  Outcome: Progressing  Intervention: Maintain Asthma Symptom Control  Recent Flowsheet Documentation  Taken 3/8/2025 0500 by Emmanuelle Tobin RN  Medication Review/Management: medications reviewed  Taken 3/8/2025 0400 by Emmanuelle Tobin RN  Medication Review/Management: medications reviewed  Taken 3/8/2025 0300 by Emmanuelle Tobin RN  Medication Review/Management: medications reviewed  Taken 3/8/2025 0200 by Emmanuelle Tobin RN  Medication Review/Management: medications reviewed  Taken 3/8/2025 0100 by Emmanuelle Tobin RN  Medication Review/Management: medications reviewed  Taken 3/8/2025 0000 by Emmanuelle Tobin RN  Medication Review/Management: medications reviewed  Goal: Maintenance of Behavioral Health Symptom Control  Outcome: Progressing  Intervention: Maintain Behavioral Health Symptom Control  Recent Flowsheet Documentation  Taken 3/8/2025 0500 by  Emmanuelle Tobin RN  Medication Review/Management: medications reviewed  Taken 3/8/2025 0400 by Emmanuelle Tobin RN  Medication Review/Management: medications reviewed  Taken 3/8/2025 0300 by Emmanuelle Tobin RN  Medication Review/Management: medications reviewed  Taken 3/8/2025 0200 by Emmanuelle Tobin RN  Medication Review/Management: medications reviewed  Taken 3/8/2025 0100 by Emmanuelle Tobin RN  Medication Review/Management: medications reviewed  Taken 3/8/2025 0000 by Emmanuelle Tobin RN  Medication Review/Management: medications reviewed  Goal: Maintenance of COPD Symptom Control  Outcome: Progressing  Intervention: Maintain COPD (Chronic Obstructive Pulmonary Disease) Symptom Control  Recent Flowsheet Documentation  Taken 3/8/2025 0500 by Emmanuelle Tobin RN  Medication Review/Management: medications reviewed  Taken 3/8/2025 0400 by Emmanuelle Tobin RN  Medication Review/Management: medications reviewed  Taken 3/8/2025 0300 by Emmanuelle Tobin RN  Medication Review/Management: medications reviewed  Taken 3/8/2025 0200 by Emmanuelle Tobin RN  Medication Review/Management: medications reviewed  Taken 3/8/2025 0100 by Emmanuelle Tobin RN  Medication Review/Management: medications reviewed  Taken 3/8/2025 0000 by Emmanuelle Tobin RN  Medication Review/Management: medications reviewed  Goal: Blood Glucose Level Within Target Range  Outcome: Progressing  Intervention: Monitor and Manage Glycemia  Recent Flowsheet Documentation  Taken 3/8/2025 0500 by Emmanuelle Tobin RN  Medication Review/Management: medications reviewed  Taken 3/8/2025 0400 by Emmanuelle Tobin RN  Medication Review/Management: medications reviewed  Taken 3/8/2025 0300 by Emmanuelle Tobin RN  Medication Review/Management: medications reviewed  Taken 3/8/2025 0200 by Emmanuelle Tobin RN  Medication Review/Management: medications reviewed  Taken 3/8/2025 0100 by Emmanuelle Tobin RN  Medication Review/Management: medications  reviewed  Taken 3/8/2025 0000 by Emmanuelle Tobin RN  Medication Review/Management: medications reviewed  Goal: Maintenance of Heart Failure Symptom Control  Outcome: Progressing  Intervention: Maintain Heart Failure Management  Recent Flowsheet Documentation  Taken 3/8/2025 0500 by Emmanuelle Tobin RN  Medication Review/Management: medications reviewed  Taken 3/8/2025 0400 by Emmanuelle Tobin RN  Medication Review/Management: medications reviewed  Taken 3/8/2025 0300 by Emmanuelle Tobin RN  Medication Review/Management: medications reviewed  Taken 3/8/2025 0200 by Emmanuelle Tobin RN  Medication Review/Management: medications reviewed  Taken 3/8/2025 0100 by Emmanuelle Tobin RN  Medication Review/Management: medications reviewed  Taken 3/8/2025 0000 by Emmanuelle Tobin RN  Medication Review/Management: medications reviewed  Goal: Blood Pressure in Desired Range  Outcome: Progressing  Intervention: Maintain Blood Pressure Management  Recent Flowsheet Documentation  Taken 3/8/2025 0500 by Emmanuelle Tobin RN  Medication Review/Management: medications reviewed  Taken 3/8/2025 0400 by Emmanuelle Tobin RN  Medication Review/Management: medications reviewed  Taken 3/8/2025 0300 by Emmanuelle Tobin RN  Medication Review/Management: medications reviewed  Taken 3/8/2025 0200 by Emmanuelle Tobin RN  Medication Review/Management: medications reviewed  Taken 3/8/2025 0100 by Emmanuelle Tobin RN  Medication Review/Management: medications reviewed  Taken 3/8/2025 0000 by Emmanuelle Tobin RN  Medication Review/Management: medications reviewed  Goal: Maintenance of Osteoarthritis Symptom Control  Outcome: Progressing  Intervention: Maintain Osteoarthritis Symptom Control  Recent Flowsheet Documentation  Taken 3/8/2025 0500 by Emmanuelle Tobin RN  Activity Management: activity encouraged  Medication Review/Management: medications reviewed  Taken 3/8/2025 0400 by Emmanuelle Tobin RN  Activity Management: activity  encouraged  Medication Review/Management: medications reviewed  Taken 3/8/2025 0300 by Emmanuelle Tobin RN  Activity Management: activity encouraged  Medication Review/Management: medications reviewed  Taken 3/8/2025 0200 by Emmanuelle Tobin RN  Activity Management: activity encouraged  Medication Review/Management: medications reviewed  Taken 3/8/2025 0100 by Emmanuelle Tobin RN  Activity Management: activity encouraged  Medication Review/Management: medications reviewed  Taken 3/8/2025 0000 by Emmanuelle Tobin RN  Activity Management: activity encouraged  Medication Review/Management: medications reviewed  Goal: Bariatric Home Regimen Maintained  Outcome: Progressing  Intervention: Maintain and Manage Postbariatric Surgery Care  Recent Flowsheet Documentation  Taken 3/8/2025 0500 by Emmanuelle Tobin RN  Medication Review/Management: medications reviewed  Taken 3/8/2025 0400 by Emmanuelle Tobin RN  Medication Review/Management: medications reviewed  Taken 3/8/2025 0300 by Emmanuelle Tobin RN  Medication Review/Management: medications reviewed  Taken 3/8/2025 0200 by Emmanuelle Tobin RN  Medication Review/Management: medications reviewed  Taken 3/8/2025 0100 by Emmanuelle Tobin RN  Medication Review/Management: medications reviewed  Taken 3/8/2025 0000 by Emmanuelle Tobin RN  Medication Review/Management: medications reviewed  Goal: Maintenance of Seizure Control  Outcome: Progressing  Intervention: Maintain Seizure Symptom Control  Recent Flowsheet Documentation  Taken 3/8/2025 0500 by Emmanuelle Tobin RN  Medication Review/Management: medications reviewed  Taken 3/8/2025 0400 by Emmanuelle Tobin RN  Medication Review/Management: medications reviewed  Taken 3/8/2025 0300 by Emmanuelle Tobin RN  Medication Review/Management: medications reviewed  Taken 3/8/2025 0200 by Emmanuelle Tobin RN  Medication Review/Management: medications reviewed  Taken 3/8/2025 0100 by Emmanuelle Tobin RN  Medication  Review/Management: medications reviewed  Taken 3/8/2025 0000 by Emmanuelle Tobin RN  Medication Review/Management: medications reviewed   Goal Outcome Evaluation:  Plan of Care Reviewed With: patient        Progress: improving

## 2025-03-08 NOTE — PROGRESS NOTES
General Surgery Progress Note    Name: Mary Gentile ADMIT: 3/7/2025   : 1975  PCP: Trinidad Pickett APRN    MRN: [id number] LOS: 0 days   AGE/SEX: 49 y.o. female  ROOM: [address] [place] [address]    Chief Complaint   Patient presents with    Post-op Problem     Pt had appendectomy on Monday, Pt now has left side abd pain States she can't straighten up, she can't cough without screaming and she can't reach around to wipe herself after using the restroom.  Pt called Dr. Mcneal and was told to come to the ER.           Subjective     Readmitted for pain.  Pain is at the extraction site of her appendix not in the right lower quadrant.  She is afebrile white blood cell count is normal.  She did however have a CT scan showing small fluid collection near appendix about 2 cm, too small to drain.    Objective     Scheduled Medications:   cefTRIAXone, 2,000 mg, Intravenous, Q24H  enoxaparin sodium, 40 mg, Subcutaneous, Daily  FLUoxetine, 40 mg, Oral, Daily  gabapentin, 800 mg, Oral, Q8H  lamoTRIgine, 25 mg, Oral, BID  metoprolol succinate XL, 50 mg, Oral, Daily  metroNIDAZOLE, 500 mg, Intravenous, Q8H  sodium chloride, 10 mL, Intravenous, Q12H        Active Infusions:  Pharmacy to Dose enoxaparin (LOVENOX),         As Needed Medications:    acetaminophen    senna-docusate sodium **AND** polyethylene glycol **AND** bisacodyl **AND** bisacodyl    melatonin    Morphine    ondansetron    Pharmacy to Dose enoxaparin (LOVENOX)    sodium chloride    sodium chloride    sodium chloride    Vital Signs  Vital Signs Patient Vitals for the past 24 hrs:   BP Temp Temp src Pulse Resp SpO2   25 1652 91/48 97.8 °F (36.6 °C) Oral 86 16 96 %   25 1217 120/71 97.8 °F (36.6 °C) Oral 84 16 94 %   25 0700 119/67 -- -- 99 -- 94 %   25 0657 119/67 97.8 °F (36.6 °C) Oral 97 20 94 %   25 0331 -- -- -- 90 -- 94 %   25 -- -- -- 85 -- 94 %   25 -- -- -- 88 -- 94 %   25 -- -- -- 89 -- 94  %   03/08/25 0327 -- -- -- 87 -- 94 %   03/08/25 0326 -- -- -- 88 -- 94 %   03/08/25 0325 -- -- -- 82 -- 94 %   03/08/25 0324 -- -- -- 92 -- 92 %   03/08/25 0323 -- -- -- 90 -- (!) 88 %   03/08/25 0322 -- -- -- 88 -- (!) 88 %   03/08/25 0321 -- -- -- 92 -- (!) 88 %   03/08/25 0320 -- -- -- 92 -- (!) 88 %   03/08/25 0319 -- -- -- 90 -- (!) 88 %   03/08/25 0318 -- -- -- 94 -- (!) 88 %   03/08/25 0317 -- -- -- 93 -- (!) 88 %   03/08/25 0316 -- -- -- 91 -- (!) 88 %   03/08/25 0315 -- -- -- 91 -- (!) 89 %   03/08/25 0314 -- -- -- 90 -- (!) 88 %   03/08/25 0313 120/69 98 °F (36.7 °C) Oral 95 16 (!) 87 %   03/08/25 0312 -- -- -- 90 -- (!) 89 %   03/08/25 0311 -- -- -- 93 -- (!) 89 %   03/08/25 0310 -- -- -- 92 -- (!) 88 %   03/08/25 0309 -- -- -- 93 -- (!) 89 %   03/08/25 0308 -- -- -- 94 -- (!) 88 %   03/08/25 0307 -- -- -- 92 -- (!) 89 %   03/08/25 0306 -- -- -- 93 -- (!) 88 %   03/08/25 0305 -- -- -- 95 -- (!) 89 %   03/08/25 0304 -- -- -- 93 -- (!) 89 %   03/08/25 0303 -- -- -- 91 -- (!) 89 %   03/08/25 0302 -- -- -- 94 -- (!) 88 %   03/08/25 0301 -- -- -- 90 -- (!) 89 %   03/08/25 0300 -- -- -- 93 -- (!) 89 %   03/08/25 0259 -- -- -- 91 -- (!) 89 %   03/08/25 0258 -- -- -- 93 -- (!) 89 %   03/08/25 0257 -- -- -- 92 -- 91 %   03/08/25 0256 -- -- -- 95 -- 91 %   03/08/25 0255 -- -- -- 97 -- 92 %   03/08/25 0254 -- -- -- 98 -- 93 %   03/08/25 0253 -- -- -- 101 -- 93 %   03/08/25 0252 -- -- -- 101 -- 94 %   03/08/25 0251 -- -- -- 97 -- 94 %   03/08/25 0250 -- -- -- 101 -- 92 %   03/08/25 0249 -- -- -- 100 -- 93 %   03/08/25 0248 -- -- -- 99 -- 93 %   03/08/25 0247 -- -- -- 103 -- 92 %   03/08/25 0246 -- -- -- 105 -- (!) 89 %   03/08/25 0245 -- -- -- 96 -- 93 %   03/08/25 0244 -- -- -- 99 -- 92 %   03/08/25 0243 -- -- -- 100 -- 93 %   03/08/25 0242 -- -- -- 99 -- 93 %   03/08/25 0241 -- -- -- 100 -- 93 %   03/08/25 0240 -- -- -- 103 -- 92 %   03/08/25 0239 -- -- -- 102 -- 92 %   03/08/25 0238 -- -- -- 102 -- 92 %    03/08/25 0237 -- -- -- 106 -- 92 %   03/08/25 0236 -- -- -- 105 -- 91 %   03/08/25 0235 -- -- -- 100 -- 93 %   03/08/25 0234 -- -- -- 105 -- 91 %   03/08/25 0233 -- -- -- 105 -- 93 %   03/08/25 0232 -- -- -- 104 -- 92 %   03/08/25 0231 -- -- -- 105 -- 93 %   03/08/25 0230 -- -- -- 108 -- 94 %   03/08/25 0229 -- -- -- 106 -- (!) 89 %   03/08/25 0228 -- -- -- 102 -- 92 %   03/08/25 0227 -- -- -- 102 -- 93 %   03/08/25 0226 -- -- -- 105 -- 92 %   03/08/25 0225 -- -- -- 104 -- 91 %   03/08/25 0224 -- -- -- 105 -- 93 %   03/08/25 0223 -- -- -- 104 -- 92 %   03/08/25 0222 -- -- -- 102 -- 92 %   03/08/25 0221 -- -- -- 102 -- 92 %   03/08/25 0220 -- -- -- 105 -- 92 %   03/08/25 0219 -- -- -- 101 -- 92 %   03/08/25 0218 -- -- -- 113 -- 91 %   03/08/25 0217 -- -- -- 112 -- (!) 89 %   03/08/25 0216 -- -- -- 111 -- (!) 89 %   03/08/25 0215 -- -- -- 115 -- (!) 87 %   03/08/25 0214 -- -- -- 119 -- (!) 86 %   03/08/25 0213 -- -- -- 107 -- (!) 88 %   03/08/25 0212 -- -- -- 97 -- 92 %   03/08/25 0211 -- -- -- 94 -- 92 %   03/08/25 0210 -- -- -- 97 -- 93 %   03/08/25 0209 -- -- -- 92 -- 92 %   03/08/25 0208 -- -- -- 90 -- 92 %   03/08/25 0207 -- -- -- 92 -- 91 %   03/08/25 0206 -- -- -- 91 -- 92 %   03/08/25 0205 -- -- -- 94 -- 92 %   03/08/25 0204 -- -- -- 92 -- 91 %   03/08/25 0203 -- -- -- 92 -- 91 %   03/08/25 0202 -- -- -- 92 -- 91 %   03/08/25 0201 -- -- -- 94 -- 91 %   03/08/25 0200 -- -- -- 93 -- 91 %   03/08/25 0159 -- -- -- 92 -- 93 %   03/08/25 0158 -- -- -- 90 -- 92 %   03/08/25 0157 -- -- -- 89 -- 92 %   03/08/25 0156 -- -- -- 93 -- 91 %   03/08/25 0155 -- -- -- 90 -- 91 %   03/08/25 0154 -- -- -- 90 -- 91 %   03/08/25 0153 -- -- -- 90 -- 91 %   03/08/25 0152 -- -- -- 90 -- 92 %   03/08/25 0151 -- -- -- 90 -- 92 %   03/08/25 0150 -- -- -- 89 -- 92 %   03/08/25 0149 -- -- -- 87 -- 93 %   03/08/25 0148 -- -- -- 91 -- 93 %   03/08/25 0147 -- -- -- 94 -- 95 %   03/08/25 0146 -- -- -- 95 -- 94 %   03/08/25 0145 -- -- -- 101  -- 94 %   03/08/25 0144 -- -- -- 99 -- (!) 86 %   03/08/25 0143 -- -- -- 90 -- (!) 89 %   03/08/25 0142 -- -- -- 90 -- 90 %   03/08/25 0141 -- -- -- 87 -- (!) 89 %   03/08/25 0140 -- -- -- 87 -- 90 %   03/08/25 0139 -- -- -- 89 -- 90 %   03/08/25 0138 -- -- -- 89 -- (!) 89 %   03/08/25 0137 -- -- -- 86 -- 90 %   03/08/25 0136 -- -- -- 86 -- 90 %   03/08/25 0135 -- -- -- 91 -- (!) 89 %   03/08/25 0134 -- -- -- 92 -- (!) 88 %   03/08/25 0133 -- -- -- 89 -- (!) 89 %   03/08/25 0132 -- -- -- 88 -- (!) 89 %   03/08/25 0131 -- -- -- 87 -- 90 %   03/08/25 0130 -- -- -- 89 -- (!) 89 %   03/08/25 0129 -- -- -- 88 -- 90 %   03/08/25 0128 -- -- -- 87 -- 90 %   03/08/25 0127 -- -- -- 88 -- 91 %   03/08/25 0126 -- -- -- 91 -- 90 %   03/08/25 0125 -- -- -- 88 -- 91 %   03/08/25 0124 -- -- -- 89 -- 90 %   03/08/25 0123 -- -- -- 87 -- 90 %   03/08/25 0122 -- -- -- 87 -- 90 %   03/08/25 0121 -- -- -- 87 -- 90 %   03/08/25 0120 -- -- -- 92 -- 91 %   03/08/25 0119 -- -- -- 88 -- 91 %   03/08/25 0118 -- -- -- 88 -- 90 %   03/08/25 0117 -- -- -- 86 -- 91 %   03/08/25 0116 -- -- -- 89 -- 90 %   03/08/25 0115 -- -- -- 88 -- 91 %   03/08/25 0114 -- -- -- 88 -- 91 %   03/08/25 0113 -- -- -- 88 -- 90 %   03/08/25 0112 -- -- -- 88 -- 90 %   03/08/25 0111 -- -- -- 86 -- 91 %   03/08/25 0110 -- -- -- 90 -- 91 %   03/08/25 0109 -- -- -- 88 -- 91 %   03/08/25 0108 -- -- -- 89 -- 90 %   03/08/25 0107 -- -- -- 89 -- 91 %   03/08/25 0106 -- -- -- 88 -- 91 %   03/08/25 0105 -- -- -- 92 -- 91 %   03/08/25 0104 -- -- -- 89 -- 91 %   03/08/25 0103 -- -- -- 87 -- 90 %   03/08/25 0102 -- -- -- 88 -- 91 %   03/08/25 0101 -- -- -- 88 -- 91 %   03/08/25 0100 -- -- -- 88 -- 92 %   03/08/25 0059 -- -- -- 92 -- 91 %   03/08/25 0058 -- -- -- 87 -- 92 %   03/08/25 0057 -- -- -- 89 -- 91 %   03/08/25 0056 -- -- -- 88 -- 92 %   03/08/25 0055 -- -- -- 88 -- 91 %   03/08/25 0054 -- -- -- 86 -- 92 %   03/08/25 0053 -- -- -- 90 -- 92 %   03/08/25 0052 -- -- -- 90 -- 92  %   03/08/25 0051 -- -- -- 89 -- 92 %   03/08/25 0050 -- -- -- 89 -- 91 %   03/08/25 0049 -- -- -- 90 -- 92 %   03/08/25 0048 -- -- -- 88 -- 91 %   03/08/25 0047 -- -- -- 88 -- 91 %   03/08/25 0046 -- -- -- 87 -- 91 %   03/08/25 0045 -- -- -- 90 -- 91 %   03/08/25 0044 -- -- -- 90 -- 91 %   03/08/25 0043 -- -- -- 91 -- 91 %   03/08/25 0042 -- -- -- 92 -- 91 %   03/08/25 0041 -- -- -- 91 -- 90 %   03/08/25 0040 -- -- -- 90 -- 91 %   03/08/25 0039 -- -- -- 88 -- 91 %   03/08/25 0038 -- -- -- 92 -- 91 %   03/08/25 0037 -- -- -- 86 -- 91 %   03/08/25 0036 -- -- -- 82 -- 92 %   03/08/25 0035 -- -- -- 89 -- 93 %   03/08/25 0034 -- -- -- 98 -- 93 %   03/08/25 0033 -- -- -- 86 -- 94 %   03/08/25 0032 -- -- -- 86 -- 94 %   03/08/25 0031 -- -- -- 85 -- 94 %   03/08/25 0030 -- -- -- 93 -- 97 %   03/08/25 0029 -- -- -- 87 -- 95 %   03/08/25 0028 -- -- -- 86 -- 95 %   03/08/25 0027 -- -- -- 90 -- 95 %   03/08/25 0026 -- -- -- 94 -- 95 %   03/08/25 0025 -- -- -- 93 -- 97 %   03/08/25 0024 -- -- -- 93 -- 97 %   03/08/25 0023 -- -- -- 88 -- 96 %   03/08/25 0022 -- -- -- 104 -- 95 %   03/08/25 0021 -- -- -- 102 -- 96 %   03/08/25 0020 -- -- -- 108 -- 96 %   03/08/25 0019 -- -- -- 110 -- 97 %   03/08/25 0018 -- -- -- (!) 121 -- 91 %   03/08/25 0017 -- -- -- 109 -- 90 %   03/08/25 0016 -- -- -- 102 -- 95 %   03/08/25 0015 -- -- -- 106 -- 94 %   03/08/25 0014 -- -- -- 104 -- 96 %   03/08/25 0013 -- -- -- 91 -- 95 %   03/08/25 0012 -- -- -- 90 -- 95 %   03/08/25 0011 -- -- -- 89 -- 95 %   03/08/25 0010 -- -- -- 90 -- 95 %   03/08/25 0009 -- -- -- 90 -- 95 %   03/08/25 0008 -- -- -- 89 -- 94 %   03/08/25 0007 -- -- -- 89 -- 95 %   03/08/25 0006 -- -- -- 88 -- 94 %   03/08/25 0005 -- -- -- 90 -- 95 %   03/08/25 0004 -- -- -- 90 -- 95 %   03/08/25 0003 -- -- -- 90 -- 95 %   03/08/25 0002 -- -- -- 90 -- 95 %   03/08/25 0001 -- -- -- 90 -- 95 %   03/08/25 0000 -- -- -- 85 -- 95 %   03/07/25 2310 -- -- -- 98 -- 90 %   03/07/25 2309 -- -- --  97 -- 90 %   03/07/25 2308 -- -- -- 96 -- 90 %   03/07/25 2307 -- -- -- 99 -- 90 %   03/07/25 2306 -- -- -- 95 -- 91 %   03/07/25 2305 -- -- -- 94 -- 91 %   03/07/25 2304 -- -- -- 97 -- 90 %   03/07/25 2303 -- -- -- 95 -- 90 %   03/07/25 2302 127/75 -- -- 93 -- 91 %   03/07/25 2301 -- -- -- 93 -- 92 %   03/07/25 2300 -- 98.2 °F (36.8 °C) Oral 100 15 94 %   03/07/25 2259 -- -- -- 92 -- 93 %   03/07/25 2258 -- -- -- 97 -- 93 %   03/07/25 2257 -- -- -- 102 -- 92 %   03/07/25 2256 -- -- -- 95 -- 90 %   03/07/25 2255 -- -- -- 100 -- 92 %   03/07/25 2254 -- -- -- 97 -- 92 %   03/07/25 2253 -- -- -- 93 -- 93 %   03/07/25 2252 -- -- -- 95 -- 93 %   03/07/25 2251 119/70 98.2 °F (36.8 °C) Oral 97 15 92 %   03/07/25 2250 -- -- -- 98 -- 92 %   03/07/25 2233 -- -- -- 106 -- 93 %   03/07/25 2232 -- -- -- 106 -- 92 %   03/07/25 2231 144/81 -- -- 100 -- 91 %   03/07/25 2230 -- -- -- 97 -- 93 %   03/07/25 2229 -- -- -- 96 -- 92 %   03/07/25 2228 -- -- -- 98 -- 95 %   03/07/25 2227 -- -- -- 95 -- 95 %   03/07/25 2226 -- -- -- 102 -- 96 %   03/07/25 2225 -- -- -- 95 -- 95 %   03/07/25 2224 -- -- -- 95 -- 94 %   03/07/25 2223 -- -- -- 98 -- 94 %   03/07/25 2222 -- -- -- 100 -- 94 %   03/07/25 2221 -- -- -- 96 -- 95 %   03/07/25 2220 -- -- -- 96 -- 94 %   03/07/25 2219 -- -- -- 94 -- 96 %   03/07/25 2218 -- -- -- 103 -- 96 %   03/07/25 2217 -- -- -- 93 -- 93 %   03/07/25 2216 131/78 -- -- 94 -- 95 %   03/07/25 2215 -- -- -- 96 -- 95 %   03/07/25 2214 -- -- -- 98 -- 95 %   03/07/25 2213 -- -- -- 100 -- 95 %   03/07/25 2212 -- -- -- 99 -- 94 %   03/07/25 2211 -- -- -- 98 -- 94 %   03/07/25 2210 -- -- -- 101 -- 95 %   03/07/25 2209 -- -- -- 100 -- 93 %   03/07/25 2208 -- -- -- 95 -- 94 %   03/07/25 2207 -- -- -- 98 -- 94 %   03/07/25 2206 -- -- -- 97 -- 95 %   03/07/25 2205 -- -- -- 98 -- 93 %   03/07/25 2204 -- -- -- 97 -- 93 %   03/07/25 2203 -- -- -- 100 -- 94 %   03/07/25 2202 -- -- -- 98 -- 95 %   03/07/25 2201 137/76 -- -- 100 -- 94  %   03/07/25 2200 -- -- -- 99 -- 95 %   03/07/25 2159 -- -- -- 99 -- 95 %   03/07/25 2158 -- -- -- 97 -- 95 %   03/07/25 2157 -- -- -- 98 -- 95 %   03/07/25 2156 -- -- -- 103 -- 95 %   03/07/25 2155 -- -- -- 98 -- 94 %   03/07/25 2154 -- -- -- 99 -- 95 %   03/07/25 2153 -- -- -- 100 -- 96 %   03/07/25 2152 -- -- -- 101 -- 93 %   03/07/25 2151 -- -- -- 102 -- 92 %   03/07/25 2150 -- -- -- 97 -- 94 %   03/07/25 2149 -- -- -- 97 -- 94 %   03/07/25 2148 -- -- -- 97 -- 94 %   03/07/25 2147 -- -- -- 97 -- 94 %   03/07/25 2146 138/80 -- -- 96 -- 93 %   03/07/25 2145 -- -- -- 97 -- 96 %   03/07/25 2144 -- -- -- 97 -- 95 %   03/07/25 2143 -- -- -- 96 -- 95 %   03/07/25 2142 -- -- -- 105 -- 95 %   03/07/25 2141 -- -- -- 99 -- 95 %   03/07/25 2140 -- -- -- 97 -- 95 %   03/07/25 2139 -- -- -- 98 -- 95 %   03/07/25 2138 -- -- -- 98 -- 94 %   03/07/25 2137 -- -- -- 99 -- 93 %   03/07/25 2136 -- -- -- 97 -- 95 %   03/07/25 2135 -- -- -- 100 -- 95 %   03/07/25 2134 -- -- -- 97 -- 96 %   03/07/25 2133 -- -- -- 97 -- 96 %   03/07/25 2132 -- -- -- 92 -- 94 %   03/07/25 2131 126/69 -- -- 94 -- 94 %   03/07/25 2130 -- -- -- 99 -- 94 %   03/07/25 2129 -- -- -- 107 -- 97 %   03/07/25 2128 -- -- -- 94 -- 94 %   03/07/25 2127 -- -- -- 98 -- 96 %   03/07/25 2126 -- -- -- 98 -- 95 %   03/07/25 2125 -- -- -- 97 -- 94 %   03/07/25 2124 -- -- -- 101 -- 95 %   03/07/25 2123 -- -- -- 96 -- 94 %   03/07/25 2122 -- -- -- 98 -- 94 %   03/07/25 2121 -- -- -- 99 -- 96 %   03/07/25 2120 -- -- -- 97 -- 95 %   03/07/25 2119 -- -- -- 99 -- 92 %   03/07/25 2118 -- -- -- 96 -- 95 %   03/07/25 2117 138/80 -- -- 98 -- 95 %   03/07/25 2116 -- -- -- 99 -- 95 %   03/07/25 2115 -- -- -- 99 -- 95 %   03/07/25 2114 -- -- -- 106 -- 94 %   03/07/25 2113 -- -- -- 119 -- 93 %   03/07/25 2112 -- -- -- 113 -- 95 %   03/07/25 2111 -- -- -- 116 -- 93 %   03/07/25 2110 -- -- -- 113 -- 91 %   03/07/25 2109 -- -- -- 112 -- 93 %   03/07/25 2108 -- -- -- 110 -- 94 %   03/07/25  2107 -- -- -- 104 -- 92 %   03/07/25 2106 -- -- -- 105 -- 93 %   03/07/25 2105 -- -- -- 105 -- 92 %   03/07/25 2104 -- -- -- 105 -- 93 %   03/07/25 2103 -- -- -- 110 -- 91 %   03/07/25 2102 (!) 144/118 -- -- 106 -- 93 %   03/07/25 2101 -- -- -- 107 -- 94 %   03/07/25 2100 -- -- -- 104 -- 93 %   03/07/25 2059 -- -- -- 102 -- 94 %   03/07/25 2058 -- -- -- 98 -- 93 %   03/07/25 2057 -- -- -- 106 -- 95 %   03/07/25 2056 -- -- -- 101 -- 93 %   03/07/25 2055 -- -- -- 99 -- 93 %   03/07/25 2054 -- -- -- 99 -- 93 %   03/07/25 2053 -- -- -- 99 -- 94 %   03/07/25 2052 -- -- -- 102 -- 93 %   03/07/25 2051 -- -- -- 97 -- 94 %   03/07/25 2050 -- -- -- 105 -- 94 %   03/07/25 2049 -- -- -- 103 -- 94 %   03/07/25 2048 -- -- -- 109 -- 93 %   03/07/25 2047 -- -- -- 101 -- 92 %   03/07/25 2046 -- -- -- 100 -- 94 %   03/07/25 2045 130/76 -- -- 102 16 94 %   03/07/25 2044 -- -- -- 100 -- 95 %   03/07/25 2043 -- -- -- 98 -- 94 %   03/07/25 2042 -- -- -- 109 -- 93 %   03/07/25 2041 -- -- -- 98 -- 93 %   03/07/25 2040 -- -- -- 102 -- 94 %   03/07/25 2039 -- -- -- 101 -- 94 %   03/07/25 2038 -- -- -- 99 -- 93 %   03/07/25 2037 -- -- -- 98 -- 94 %   03/07/25 2036 -- -- -- 105 -- 95 %   03/07/25 2035 -- -- -- 102 -- 96 %   03/07/25 2034 -- -- -- 103 -- 96 %   03/07/25 2033 -- -- -- 98 -- 92 %   03/07/25 2032 -- -- -- 100 -- 94 %   03/07/25 2031 -- -- -- 97 -- 93 %   03/07/25 2030 146/79 -- -- 101 -- 94 %   03/07/25 2029 -- -- -- 103 -- 94 %   03/07/25 2028 -- -- -- 101 -- 96 %   03/07/25 2027 -- -- -- 104 -- 96 %   03/07/25 2026 -- -- -- 95 -- 93 %   03/07/25 2025 -- -- -- 92 -- 94 %   03/07/25 2024 -- -- -- 97 -- 94 %   03/07/25 2023 -- -- -- 100 -- 95 %   03/07/25 2022 -- -- -- 94 -- 95 %   03/07/25 2021 -- -- -- 106 -- 97 %   03/07/25 2020 -- -- -- 103 -- 95 %   03/07/25 2019 -- -- -- 102 -- 97 %   03/07/25 2018 -- -- -- 100 -- 96 %   03/07/25 2017 -- -- -- 102 -- 92 %   03/07/25 2016 -- -- -- 98 -- 96 %   03/07/25 2015 137/76 -- -- 100 17  97 %   03/07/25 2014 -- -- -- 101 -- 95 %   03/07/25 2013 -- -- -- 98 -- 97 %   03/07/25 2012 -- -- -- 99 -- 97 %   03/07/25 2011 -- -- -- 102 -- 94 %   03/07/25 2010 -- -- -- 94 -- 96 %   03/07/25 2009 -- -- -- 102 -- 95 %   03/07/25 2008 -- -- -- 101 -- 97 %   03/07/25 2007 -- -- -- 107 -- 97 %   03/07/25 2006 -- -- -- 103 -- 95 %   03/07/25 2005 -- -- -- 103 -- 96 %   03/07/25 2004 (!) 67/42 -- -- 101 -- 97 %   03/07/25 2003 -- -- -- 102 -- 94 %   03/07/25 1945 126/69 -- -- 101 17 95 %   03/07/25 1930 133/69 -- -- 102 -- 94 %   03/07/25 1915 130/80 -- -- 96 17 95 %   03/07/25 1900 132/66 -- -- 97 -- 96 %     I/O:  No intake/output data recorded.    Physical Exam:  Physical Exam  Constitutional:       General: She is not in acute distress.     Appearance: Normal appearance. She is not ill-appearing.   HENT:      Head: Normocephalic and atraumatic.      Right Ear: External ear normal.      Left Ear: External ear normal.   Eyes:      Extraocular Movements: Extraocular movements intact.      Conjunctiva/sclera: Conjunctivae normal.   Cardiovascular:      Rate and Rhythm: Normal rate and regular rhythm.   Pulmonary:      Effort: Pulmonary effort is normal. No respiratory distress.   Abdominal:      General: There is no distension.      Palpations: Abdomen is soft.      Tenderness: There is no abdominal tenderness.   Musculoskeletal:         General: No swelling or deformity.   Skin:     General: Skin is warm and dry.   Neurological:      Mental Status: She is alert and oriented to person, place, and time. Mental status is at baseline.         Results Review:     CBC    Results from last 7 days   Lab Units 03/08/25  0003 03/07/25  1752 03/03/25  1515   WBC 10*3/mm3 9.38 10.08 19.10*   HEMOGLOBIN g/dL 10.1* 10.8* 12.1   PLATELETS 10*3/mm3 487* 506* 419     BMP   Results from last 7 days   Lab Units 03/08/25  0003 03/07/25  1752 03/03/25  1515   SODIUM mmol/L 140 139 143   POTASSIUM mmol/L 4.0 3.8 3.4*   CHLORIDE mmol/L  102 102 106   CO2 mmol/L 27.7 25.7 24.0   BUN mg/dL 7 9 9   CREATININE mg/dL 0.67 0.69 0.76   GLUCOSE mg/dL 89 86 86     Radiology(recent) CT Abdomen Pelvis With Contrast  Result Date: 3/7/2025  Postsurgical changes. Small collection to the right of the urinary bladder with some peripheral enhancement favored to represent a small postoperative seroma. Residual inflammation noted within the region. Underlying infectious component cannot be excluded. Colonic diverticulosis without evidence of diverticulitis. Electronically Signed: Neal Batista MD  3/7/2025 9:14 PM EST  Workstation ID: [id number]     I reviewed the patient's new clinical results.    Assessment & Plan       Abdominal pain      49-year-old lady status post laparoscopic appendectomy.  Readmitted for pain.  Pain is at the extraction site of her appendix not in the right lower quadrant.  She is afebrile white blood cell count is normal.  She did however have a CT scan showing small fluid collection near appendix about 2 cm, too small to drain.  Okay for discharge on antibiotics, follow-up in the office as scheduled.        This note was created using Dragon Voice Recognition software.    Pablo Mcneal MD  03/08/25  18:57 EST

## 2025-03-08 NOTE — PLAN OF CARE
Problem: Adult Inpatient Plan of Care  Goal: Plan of Care Review  Outcome: Progressing  Flowsheets (Taken 3/7/2025 2113)  Plan of Care Reviewed With: patient  Goal: Patient-Specific Goal (Individualized)  Outcome: Progressing  Goal: Absence of Hospital-Acquired Illness or Injury  Outcome: Progressing  Goal: Optimal Comfort and Wellbeing  Outcome: Progressing  Goal: Readiness for Transition of Care  Outcome: Progressing     Problem: Pain Acute  Goal: Optimal Pain Control and Function  Outcome: Progressing     Problem: Fall Injury Risk  Goal: Absence of Fall and Fall-Related Injury  Outcome: Progressing     Problem: Breathing Pattern Ineffective  Goal: Effective Breathing Pattern  Outcome: Progressing   Goal Outcome Evaluation:  Plan of Care Reviewed With: patient

## 2025-03-11 NOTE — CASE MANAGEMENT/SOCIAL WORK
Case Management Discharge Note      Final Note: routine home         Selected Continued Care - Discharged on 3/8/2025 Admission date: 3/7/2025 - Discharge disposition: Home or Self Care         Transportation Services  Private: Car    Final Discharge Disposition Code: 01 - home or self-care

## 2025-03-12 LAB
BACTERIA SPEC AEROBE CULT: NORMAL
BACTERIA SPEC AEROBE CULT: NORMAL

## 2025-03-17 NOTE — OP NOTE
Operative Report:    Patient Name:  Mary Gentile  YOB: 1975    Date of Surgery:  3/3/2025     Indications:    49-year-old lady with history of 2 C-sections, hysterectomy, cholecystectomy who presents with acute onset right lower quadrant abdominal pain and nausea.  White blood cell count elevated.  CT with appendicitis.  Discussed risks, benefits and alternatives to laparoscopic appendectomy, including option of nonoperative management, risks including bowel injury, abscess, infection or bleeding, and need to convert to an open procedure and patient elected to proceed with surgery     Pre-op Diagnosis:   Acute appendicitis       Post-Op Diagnosis Codes:  Gangrenous appendicitis with contained perforation    Procedure/CPT® Codes:  No CPT Code Applied in Case Entry    Procedure(s):  APPENDECTOMY LAPAROSCOPIC    Staff:  Surgeon(s):  Pablo Mcneal MD    Circulator: Love Tinoco RN  Scrub Person: Latrice Rivas        Anesthesia: General    Estimated Blood Loss: minimal    Implants:    Implant Name Type Inv. Item Serial No.  Lot No. LRB No. Used Action   RELOAD ECHELON ENDOPATH GST 45MM KRISTEL - [id number] Implant RELOAD ECHELON ENDOPATH GST 45MM KRISTEL  [employer]  DIV OF J AND J [id number] N/A 1 Implanted       Specimen:          Specimens       ID Source Type Tests Collected By Collected At Frozen?    A Large Intestine, Appendix Tissue TISSUE PATHOLOGY EXAM   Pablo Mcneal MD 3/3/25 0212 No    Description: appendix                Findings: Gangrenous acute appendicitis with contained perforation which was small about 1 cm containing thick foul-smelling purulent fluid that was present before surgery    Complications: None    Description of Procedure:   After risk-benefit alternatives were discussed patient informed consent was obtained.  Patient was transported the operating room placed supine the operating table and underwent general anesthesia.  His abdomen was prepped and draped in sterile  fashion surgical timeout completed.  I inserted a 5 mm trocar in the left upper quadrant under laparoscopic visualization.  Insufflated the peritoneal cavity inspected the area below my entry site with no injury identified.  Patient was placed in Trendelenburg and right side up position.  I inserted a 5 mm trocar in the left lower quadrant 12 mm trocar in the left lateral abdomen.  I turned my attention the right lower quadrant.  Patient had Gangrenous acute appendicitis with contained perforation which was small about 1 cm containing thick foul-smelling purulent fluid that was present before surgery.  I grasped the tip of the appendix and retracted this toward the abdominal wall.  I divided the mesoappendix with the [place] tip LigaSure.  Once identified the base of the appendix I divided the base the appendix with a powered [place] MILAGRO 45 stapler with a blue load.  I checked the staple line for hemostasis there is no bleeding identified.  I used the suction  to suction out the right lower quadrant.  Appendix was placed in an Endo Catch bag and removed from the 12 mm port site.  The fascia the 12 mm port site was closed with 0 Vicryl suture using laparoscopic suture passer.  The abdomen was desufflated and the remaining trocars were removed.  Skin was reapproximated with 4-0 Vicryl suture and covered surgical glue.  Patient was awoken general anesthesia and transported recovery and without incident.       Pablo Mcneal MD     Date: 3/17/2025  Time: 10:27 EDT    This note was created using Dragon Voice Recognition software.

## 2025-03-20 ENCOUNTER — OFFICE VISIT (OUTPATIENT)
Dept: SURGERY | Facility: CLINIC | Age: 50
End: 2025-03-20
Payer: COMMERCIAL

## 2025-03-20 VITALS
TEMPERATURE: 97.3 F | SYSTOLIC BLOOD PRESSURE: 126 MMHG | HEART RATE: 95 BPM | BODY MASS INDEX: 34.77 KG/M2 | WEIGHT: 177.1 LBS | DIASTOLIC BLOOD PRESSURE: 82 MMHG | OXYGEN SATURATION: 100 % | HEIGHT: 60 IN

## 2025-03-20 DIAGNOSIS — K52.9 CHRONIC DIARRHEA: ICD-10-CM

## 2025-03-20 DIAGNOSIS — Z90.49 STATUS POST APPENDECTOMY: Primary | ICD-10-CM

## 2025-03-20 PROCEDURE — 99024 POSTOP FOLLOW-UP VISIT: CPT

## 2025-03-20 NOTE — PROGRESS NOTES
"Chief Complaint  Post-op Follow-up (PO Appendectomy 3/3/25 [facility])    Subjective        Mary Gentile presents to [facility] status post laparoscopic appendectomy with Dr. Mcneal on 3/3/2025.  Overall doing well, does report minimal pain with certain movements.  Denies fevers and chills.  Tolerating regular diet without nausea or vomiting.  Reports she is having diarrhea which she describes as her baseline.       Objective   Vital Signs:  /82 (BP Location: Left arm, Patient Position: Sitting, Cuff Size: Large Adult)   Pulse 95   Temp 97.3 °F (36.3 °C) (Infrared)   Ht 152.4 cm (60\")   Wt 80.3 kg (177 lb 1.6 oz)   SpO2 100%   BMI 34.59 kg/m²   Estimated body mass index is 34.59 kg/m² as calculated from the following:    Height as of this encounter: 152.4 cm (60\").    Weight as of this encounter: 80.3 kg (177 lb 1.6 oz).          Physical Exam  Constitutional:       General: She is not in acute distress.  Cardiovascular:      Rate and Rhythm: Normal rate.   Pulmonary:      Effort: Pulmonary effort is normal. No respiratory distress.   Abdominal:      General: There is no distension.      Palpations: Abdomen is soft.      Tenderness: There is no abdominal tenderness. There is no guarding.      Comments: Incisions appear to be healing well.  There is some resolving ecchymosis adjacent to incisions.   Neurological:      Mental Status: She is alert. Mental status is at baseline.   Psychiatric:         Mood and Affect: Mood normal.         Behavior: Behavior normal.        Result Review :                Assessment and Plan   Diagnoses and all orders for this visit:    1. Status post appendectomy (Primary)    Status post laparoscopic appendectomy with Dr. Mcneal on 3/3/2025.  Overall doing well, does report minimal pain with certain movements.  Denies fevers and chills.  Tolerating regular diet without nausea or vomiting.  Reports she is having diarrhea which she describes as " her baseline.  Pathology discussed with patient.  Upon examination, patient states that she has had diarrhea for quite some time that affects her job and daily living.  Will refer patient to GI.  No further physical restrictions.  No dietary restrictions.  Okay to return to work. Can follow-up as needed.         Follow Up   No follow-ups on file.  Patient was given instructions and counseling regarding her condition or for health maintenance advice. Please see specific information pulled into the AVS if appropriate.

## (undated) DEVICE — ENDOPATH XCEL WITH OPTIVIEW TECHNOLOGY UNIVERSAL TROCAR STABILITY SLEEVES: Brand: ENDOPATH XCEL OPTIVIEW

## (undated) DEVICE — SOL IRR NACL 0.9PCT BO 1000ML

## (undated) DEVICE — UNDERGLV SURG BIOGEL INDICATOR LF PF 7.5

## (undated) DEVICE — SUT VIC 0 UR6 27IN VCP603H

## (undated) DEVICE — ENDOPATH XCEL WITH OPTIVIEW TECHNOLOGY BLADELESS TROCARS WITH STABILITY SLEEVES: Brand: ENDOPATH XCEL OPTIVIEW

## (undated) DEVICE — SOL IRR H2O BO 1000ML STRL

## (undated) DEVICE — INSUFFLATION TUBING SET, ENDOFLATOR 50: Brand: N.A.

## (undated) DEVICE — THE STERILE CAMERA HANDLE COVER IS FOR USE WITH THE STERIS SURGICAL LIGHTING AND VISUALIZATION SYSTEMS.

## (undated) DEVICE — GLV SURG BIOGEL LTX PF 7

## (undated) DEVICE — MARYLAND JAW LAPAROSCOPIC SEALER/DIVIDER COATED: Brand: LIGASURE

## (undated) DEVICE — BLANKT WARM UPPR/BDY ARM/OUT 57X196CM

## (undated) DEVICE — 2, DISPOSABLE SUCTION/IRRIGATOR WITH DISPOSABLE TIP: Brand: STRYKEFLOW

## (undated) DEVICE — THE STERILE LIGHT HANDLE COVER IS USED WITH STERIS SURGICAL LIGHTING AND VISUALIZATION SYSTEMS.

## (undated) DEVICE — SYR LUERLOK 30CC

## (undated) DEVICE — ENDOPATH XCEL BLADELESS TROCARS WITH STABILITY SLEEVES: Brand: ENDOPATH XCEL

## (undated) DEVICE — 40580 - THE PINK PAD - ADVANCED TRENDELENBURG POSITIONING KIT: Brand: 40580 - THE PINK PAD - ADVANCED TRENDELENBURG POSITIONING KIT

## (undated) DEVICE — INTENDED FOR TISSUE SEPARATION, AND OTHER PROCEDURES THAT REQUIRE A SHARP SURGICAL BLADE TO PUNCTURE OR CUT.: Brand: BARD-PARKER ® CARBON RIB-BACK BLADES

## (undated) DEVICE — THE ECHELON FLEX POWERED PLUS ARTICULATING ENDOSCOPIC LINEAR CUTTERS ARE STERILE, SINGLE PATIENT USE INSTRUMENTS THAT SIMULTANEOUSLYCUT AND STAPLE TISSUE. THERE ARE SIX STAGGERED ROWS OF STAPLES, THREE ON EITHER SIDE OF THE CUT LINE. THE ECHELON FLEX 45 POWERED PLUSINSTRUMENTS HAVE A STAPLE LINE THAT IS APPROXIMATELY 45 MM LONG AND A CUT LINE THAT IS APPROXIMATELY 42 MM LONG. THE SHAFT CAN ROTATE FREELYIN BOTH DIRECTIONS AND AN ARTICULATION MECHANISM ENABLES THE DISTAL PORTION OF THE SHAFT TO PIVOT TO FACILITATE LATERAL ACCESS TO THE OPERATIVESITE.THE INSTRUMENTS ARE PACKAGED WITH A PRIMARY LITHIUM BATTERY PACK THAT MUST BE INSTALLED PRIOR TO USE. THERE ARE SPECIFIC REQUIREMENTS FORDISPOSING OF THE BATTERY PACK. REFER TO THE BATTERY PACK DISPOSAL SECTION.THE INSTRUMENTS ARE PACKAGED WITHOUT A RELOAD AND MUST BE LOADED PRIOR TO USE. A STAPLE RETAINING CAP ON THE RELOAD PROTECTS THE STAPLE LEGPOINTS DURING SHIPPING AND TRANSPORTATION. THE INSTRUMENTS’ LOCK-OUT FEATURE IS DESIGNED TO PREVENT A USED OR IMPROPERLY INSTALLED RELOADFROM BEING REFIRED OR AN INSTRUMENT FROM BEING FIRED WITHOUT A RELOAD.: Brand: ECHELON FLEX

## (undated) DEVICE — ANTIBACTERIAL UNDYED BRAIDED (POLYGLACTIN 910), SYNTHETIC ABSORBABLE SUTURE: Brand: COATED VICRYL

## (undated) DEVICE — 3M™ STERI-STRIP™ REINFORCED ADHESIVE SKIN CLOSURES, R1547, 1/2 IN X 4 IN (12 MM X 100 MM), 6 STRIPS/ENVELOPE: Brand: 3M™ STERI-STRIP™

## (undated) DEVICE — GENERAL LAPAROSCOPY CDS: Brand: MEDLINE INDUSTRIES, INC.

## (undated) DEVICE — KT SURG TURNOVER 050

## (undated) DEVICE — BG RETRV TISS SUPERBAG INTRO RIP/STOP NLY 10MM 240ML MD